# Patient Record
Sex: FEMALE | Race: BLACK OR AFRICAN AMERICAN | Employment: FULL TIME | URBAN - METROPOLITAN AREA
[De-identification: names, ages, dates, MRNs, and addresses within clinical notes are randomized per-mention and may not be internally consistent; named-entity substitution may affect disease eponyms.]

---

## 2022-01-25 ENCOUNTER — OFFICE VISIT (OUTPATIENT)
Dept: ORTHOPEDIC SURGERY | Age: 62
End: 2022-01-25
Payer: COMMERCIAL

## 2022-01-25 VITALS — BODY MASS INDEX: 39.15 KG/M2 | HEIGHT: 65 IN | WEIGHT: 235 LBS

## 2022-01-25 DIAGNOSIS — M17.12 OSTEOARTHRITIS OF LEFT KNEE, UNSPECIFIED OSTEOARTHRITIS TYPE: ICD-10-CM

## 2022-01-25 DIAGNOSIS — M25.561 RIGHT KNEE PAIN, UNSPECIFIED CHRONICITY: ICD-10-CM

## 2022-01-25 DIAGNOSIS — M17.11 OSTEOARTHRITIS OF RIGHT KNEE, UNSPECIFIED OSTEOARTHRITIS TYPE: ICD-10-CM

## 2022-01-25 DIAGNOSIS — M25.561 RIGHT KNEE PAIN, UNSPECIFIED CHRONICITY: Primary | ICD-10-CM

## 2022-01-25 PROCEDURE — 99203 OFFICE O/P NEW LOW 30 MIN: CPT | Performed by: ORTHOPAEDIC SURGERY

## 2022-01-25 RX ORDER — CYCLOBENZAPRINE HCL 10 MG
TABLET ORAL
COMMUNITY
Start: 2022-01-17

## 2022-01-25 RX ORDER — IBUPROFEN 200 MG
TABLET ORAL
COMMUNITY
Start: 2021-08-03 | End: 2022-07-26

## 2022-01-25 RX ORDER — METHYLPREDNISOLONE 4 MG/1
TABLET ORAL
COMMUNITY
Start: 2021-12-22 | End: 2022-07-26

## 2022-01-25 RX ORDER — TRAMADOL HYDROCHLORIDE 50 MG/1
TABLET ORAL
COMMUNITY
Start: 2021-12-22 | End: 2022-07-26

## 2022-01-25 RX ORDER — CARVEDILOL 12.5 MG/1
12.5 TABLET ORAL 2 TIMES DAILY
COMMUNITY
Start: 2022-01-17 | End: 2022-07-26

## 2022-01-25 RX ORDER — MELOXICAM 7.5 MG/1
TABLET ORAL
COMMUNITY
Start: 2021-12-06 | End: 2022-07-26

## 2022-01-25 NOTE — PATIENT INSTRUCTIONS
Knee Arthritis: Care Instructions  Your Care Instructions     Knee arthritis is a breakdown of the cartilage that cushions your knee joint. When the cartilage wears down, your bones rub against each other. This causes pain and stiffness. Knee arthritis tends to get worse with time. Treatment for knee arthritis involves reducing pain, making the leg muscles stronger, and staying at a healthy body weight. The treatment usually does not improve the health of the cartilage, but it can reduce pain and improve how well your knee works. You can take simple measures to protect your knee joints, ease your pain, and help you stay active. Follow-up care is a key part of your treatment and safety. Be sure to make and go to all appointments, and call your doctor if you are having problems. It's also a good idea to know your test results and keep a list of the medicines you take. How can you care for yourself at home? · Know that knee arthritis will cause more pain on some days than on others. · Stay at a healthy weight. Lose weight if you are overweight. When you stand up, the pressure on your knees from every pound of body weight is multiplied four times. So if you lose 10 pounds, you will reduce the pressure on your knees by 40 pounds. · Talk to your doctor or physical therapist about exercises that will help ease joint pain. ? Stretch to help prevent stiffness and to prevent injury before you exercise. You may enjoy gentle forms of yoga to help keep your knee joints and muscles flexible. ? Walk instead of jog.  ? Ride a bike. This makes your thigh muscles stronger and takes pressure off your knee. ? Wear well-fitting and comfortable shoes. ? Exercise in chest-deep water. This can help you exercise longer with less pain. ? Avoid exercises that include squatting or kneeling. They can put a lot of strain on your knees.   ? Talk to your doctor to make sure that the exercise you do is not making the arthritis worse.  · Do not sit for long periods of time. Try to walk once in a while to keep your knee from getting stiff. · Ask your doctor or physical therapist whether shoe inserts may reduce your arthritis pain. · If you can afford it, get new athletic shoes at least every year. This can help reduce the strain on your knees. · Use a device to help you do everyday activities. ? A cane or walking stick can help you keep your balance when you walk. Hold the cane or walking stick in the hand opposite the painful knee. ? If you feel like you may fall when you walk, try using crutches or a front-wheeled walker. These can prevent falls that could cause more damage to your knee. ? A knee brace may help keep your knee stable and prevent pain. ? You also can use other things to make life easier, such as a higher toilet seat and handrails in the bathtub or shower. · Take pain medicines exactly as directed. ? Do not wait until you are in severe pain. You will get better results if you take it sooner. ? If you are not taking a prescription pain medicine, take an over-the-counter medicine such as acetaminophen (Tylenol), ibuprofen (Advil, Motrin), or naproxen (Aleve). Read and follow all instructions on the label. ? Do not take two or more pain medicines at the same time unless the doctor told you to. Many pain medicines have acetaminophen, which is Tylenol. Too much acetaminophen (Tylenol) can be harmful. ? Tell your doctor if you take a blood thinner, have diabetes, or have allergies to shellfish. · Ask your doctor if you might benefit from a shot of steroid medicine into your knee. This may provide pain relief for several months. · Many people take the supplements glucosamine and chondroitin for osteoarthritis. Some people feel they help, but the medical research does not show that they work. Talk to your doctor before you take these supplements. When should you call for help?    Call your doctor now or seek immediate medical care if:    · You have sudden swelling, warmth, or pain in your knee.     · You have knee pain and a fever or rash.     · You have such bad pain that you cannot use your knee. Watch closely for changes in your health, and be sure to contact your doctor if you have any problems. Where can you learn more? Go to http://www.gray.com/  Enter W187 in the search box to learn more about \"Knee Arthritis: Care Instructions. \"  Current as of: April 30, 2021               Content Version: 13.0  © 6159-1754 [employer]. Care instructions adapted under license by [employer] (which disclaims liability or warranty for this information). If you have questions about a medical condition or this instruction, always ask your healthcare professional. [address] any warranty or liability for your use of this information.

## 2022-01-25 NOTE — Clinical Note
1/27/2022    Patient: Ml Brandt   YOB: 1960   Date of Visit: 1/25/2022     Naeem Garnica MD  Via     Dear Naeem Garnica MD,      Thank you for referring Ms. Marina Al to [address] [place] [address] [facility] for evaluation. My notes for this consultation are attached. If you have questions, please do not hesitate to call me. I look forward to following your patient along with you.       Sincerely,    Kade Chaudhry MD

## 2022-01-25 NOTE — PROGRESS NOTES
Name: Claudell Sida    : 1960     Service Dept: [address] [place] [address] Sports Medicine    Chief Complaint   Patient presents with    Knee Pain        Visit Vitals  Ht 5' 5\" (1.651 m)   Wt 235 lb (106.6 kg)   BMI 39.11 kg/m²        Allergies   Allergen Reactions    Lyrica [Pregabalin] Anaphylaxis    Darvocet A500 [Propoxyphene N-Acetaminophen] Nausea and Vomiting    Reglan [Metoclopramide] Other (comments)     Facial Spasms        Current Outpatient Medications   Medication Sig Dispense Refill    ibuprofen (MOTRIN) 200 mg tablet 0 Refills      carvediloL (COREG) 12.5 mg tablet Take 12.5 mg by mouth two (2) times a day.  meloxicam (MOBIC) 7.5 mg tablet       cyclobenzaprine (FLEXERIL) 10 mg tablet       traMADoL (ULTRAM) 50 mg tablet TAKE 1 TABLET BY MOUTH EVERY 6 HOURS FOR UP TO 5 DAYS AS NEEDED FOR MODERATE PAIN OR SEVERE PAIN      methylPREDNISolone (MEDROL DOSEPACK) 4 mg tablet FOLLOW PACKAGE DIRECTIONS      amLODIPine (NORVASC) 10 mg tablet Take  by mouth daily.  metFORMIN (GLUCOPHAGE) 500 mg tablet Take  by mouth two (2) times daily (with meals).  ferrous sulfate (IRON) 325 mg (65 mg iron) EC tablet Take 325 mg by mouth three (3) times daily (with meals).  methimazole (TAPAZOLE) 5 mg tablet Take 5 mg by mouth daily.  gabapentin (NEURONTIN) 300 mg capsule Take 300 mg by mouth nightly.  acetaminophen (TYLENOL) 650 mg CR tablet Take 650 mg by mouth every six (6) hours as needed for Pain.  naproxen sodium 220 mg cap Take  by mouth.  albuterol (PROVENTIL HFA, VENTOLIN HFA, PROAIR HFA) 90 mcg/actuation inhaler Take  by inhalation.  zolpidem (AMBIEN) 10 mg tablet Take  by mouth nightly as needed for Sleep.  valsartan (DIOVAN) 320 mg tablet Take 1 Tab by mouth daily. STOP DIOVAN HCTZ 30 Tab 6    carvedilol (COREG CR) 20 mg CR capsule Take 1 Cap by mouth daily.  STOP PLAIN COREG 30 Cap 6      There is no problem list on file for this patient. Family History   Problem Relation Age of Onset    Diabetes Father     Hypertension Father     Stroke Father     Hypertension Mother       Social History     Socioeconomic History    Marital status:    Tobacco Use    Smoking status: Never Smoker    Smokeless tobacco: Never Used   Vaping Use    Vaping Use: Never used   Substance and Sexual Activity    Alcohol use: Not Currently     Alcohol/week: 0.0 standard drinks    Drug use: Never    Sexual activity: Not Currently      Past Surgical History:   Procedure Laterality Date    HX CHOLECYSTECTOMY  1984    HX HYSTERECTOMY  2001      Past Medical History:   Diagnosis Date    Anemia     Arthritis     HTN (hypertension)     PCOS (polycystic ovarian syndrome)     Sarcoidosis 1981    Thyroid condition         I have reviewed and agree with [address] [place] [address] Nw and ROS and intake form in chart and the record furthermore I have reviewed prior medical record(s) regarding this patients care during this appointment. Review of Systems:   Patient is a pleasant appearing individual, appropriately dressed, well hydrated, well nourished, who is alert, appropriately oriented for age, and in no acute distress with a cane based gait and normal affect who does not appear to be in any significant pain. Physical Exam:  Left Knee -Decrease range of motion with flexion, Knee arc of greater than 50 degrees, Some crepitation, Grossly neurovascularly intact, Good cap refill, No skin lesion, Moderate swelling, No gross instability, Some quadriceps weakness Kellgren and Denzel at least grade 3    Right Knee -Decrease range of motion with flexion, Some crepitation, Grossly neurovascularly intact, Good cap refill, No skin lesion, Moderate swelling, No gross instability, Some quadriceps weaknessKellgren and Denzel at least grade 3   Encounter Diagnoses     ICD-10-CM ICD-9-CM   1. Right knee pain, unspecified chronicity  M25.561 719.46   2.  Osteoarthritis of right knee, unspecified osteoarthritis type  M17.11 715.96   3. Osteoarthritis of left knee, unspecified osteoarthritis type  M17.12 715.96       HPI:  The patient is here with a chief complaint of bilateral knee pain, throbbing, burning pain. It is a lot better. Pain is 2/10. X-rays are positive for severe OA of bilateral knees, especially on the right. Assessment/Plan:  Plan will be for right total knee replacement, general medical clearance, outpatient surgery, and we will go from there. If the patient gets worse, she is to give me a call. No restrictions in the meantime. Outpatient surgery on 5/23/2022. As part of continued conservative pain management options the patient was advised to utilize Tylenol or OTC NSAIDS as long as it is not medically contraindicated. Return to Office: Follow-up and Dispositions    · Return for schedule for surgery. Scribed by Hortensia Maradiaga LPN as dictated by [facility] [place] SARAH BETH Rojas MD.  Documentation True and Accepted Michael Rojas MD

## 2022-06-20 ENCOUNTER — HOSPITAL ENCOUNTER (OUTPATIENT)
Dept: LAB | Age: 62
Discharge: HOME | End: 2022-06-20
Payer: COMMERCIAL

## 2022-06-20 ENCOUNTER — HOSPITAL ENCOUNTER (OUTPATIENT)
Dept: GENERAL RADIOLOGY | Age: 62
Discharge: HOME | End: 2022-06-20
Payer: COMMERCIAL

## 2022-06-20 ENCOUNTER — HOSPITAL ENCOUNTER (OUTPATIENT)
Dept: NON INVASIVE DIAGNOSTICS | Age: 62
Discharge: HOME | End: 2022-06-20
Payer: COMMERCIAL

## 2022-06-20 DIAGNOSIS — M25.561 RIGHT KNEE PAIN, UNSPECIFIED CHRONICITY: ICD-10-CM

## 2022-06-20 DIAGNOSIS — M17.11 OSTEOARTHRITIS OF RIGHT KNEE, UNSPECIFIED OSTEOARTHRITIS TYPE: ICD-10-CM

## 2022-06-20 LAB
ATRIAL RATE: 70 BPM
CALCULATED P AXIS, ECG09: 56 DEGREES
CALCULATED R AXIS, ECG10: -5 DEGREES
CALCULATED T AXIS, ECG11: -135 DEGREES
DIAGNOSIS, 93000: NORMAL
P-R INTERVAL, ECG05: 180 MS
Q-T INTERVAL, ECG07: 408 MS
QRS DURATION, ECG06: 84 MS
QTC CALCULATION (BEZET), ECG08: 440 MS
VENTRICULAR RATE, ECG03: 70 BPM

## 2022-06-20 PROCEDURE — 93005 ELECTROCARDIOGRAM TRACING: CPT

## 2022-06-20 PROCEDURE — 93010 ELECTROCARDIOGRAM REPORT: CPT | Performed by: ORTHOPAEDIC SURGERY

## 2022-06-20 PROCEDURE — 71046 X-RAY EXAM CHEST 2 VIEWS: CPT

## 2022-07-01 LAB
BASOPHILS # BLD AUTO: 0.1 X10E3/UL (ref 0–0.2)
BASOPHILS NFR BLD AUTO: 1 %
BUN SERPL-MCNC: 19 MG/DL (ref 8–27)
BUN/CREAT SERPL: 12 (ref 12–28)
CALCIUM SERPL-MCNC: 9.9 MG/DL (ref 8.7–10.3)
CHLORIDE SERPL-SCNC: 99 MMOL/L (ref 96–106)
CO2 SERPL-SCNC: 16 MMOL/L (ref 20–29)
CREAT SERPL-MCNC: 1.55 MG/DL (ref 0.57–1)
EGFR: 38 ML/MIN/1.73
EOSINOPHIL # BLD AUTO: 0.4 X10E3/UL (ref 0–0.4)
EOSINOPHIL NFR BLD AUTO: 3 %
ERYTHROCYTE [DISTWIDTH] IN BLOOD BY AUTOMATED COUNT: 19.9 % (ref 11.7–15.4)
GLUCOSE SERPL-MCNC: 88 MG/DL (ref 65–99)
HCT VFR BLD AUTO: 32.9 % (ref 34–46.6)
HGB BLD-MCNC: 10.6 G/DL (ref 11.1–15.9)
IMM GRANULOCYTES # BLD AUTO: 0 X10E3/UL (ref 0–0.1)
IMM GRANULOCYTES NFR BLD AUTO: 0 %
LYMPHOCYTES # BLD AUTO: 2.5 X10E3/UL (ref 0.7–3.1)
LYMPHOCYTES NFR BLD AUTO: 21 %
MCH RBC QN AUTO: 21.8 PG (ref 26.6–33)
MCHC RBC AUTO-ENTMCNC: 32.2 G/DL (ref 31.5–35.7)
MCV RBC AUTO: 68 FL (ref 79–97)
MONOCYTES # BLD AUTO: 0.7 X10E3/UL (ref 0.1–0.9)
MONOCYTES NFR BLD AUTO: 6 %
MRSA DNA SPEC QL NAA+PROBE: NEGATIVE
NEUTROPHILS # BLD AUTO: 8 X10E3/UL (ref 1.4–7)
NEUTROPHILS NFR BLD AUTO: 69 %
PLATELET # BLD AUTO: 345 X10E3/UL (ref 150–450)
POTASSIUM SERPL-SCNC: 5.5 MMOL/L (ref 3.5–5.2)
RBC # BLD AUTO: 4.86 X10E6/UL (ref 3.77–5.28)
SODIUM SERPL-SCNC: 136 MMOL/L (ref 134–144)
WBC # BLD AUTO: 11.7 X10E3/UL (ref 3.4–10.8)

## 2022-07-07 DIAGNOSIS — M25.561 RIGHT KNEE PAIN, UNSPECIFIED CHRONICITY: Primary | ICD-10-CM

## 2022-07-07 DIAGNOSIS — M17.11 OSTEOARTHRITIS OF RIGHT KNEE, UNSPECIFIED OSTEOARTHRITIS TYPE: ICD-10-CM

## 2022-07-07 DIAGNOSIS — Z96.651 STATUS POST TOTAL RIGHT KNEE REPLACEMENT: ICD-10-CM

## 2022-07-14 ENCOUNTER — TRANSCRIBE ORDER (OUTPATIENT)
Dept: SCHEDULING | Age: 62
End: 2022-07-14

## 2022-07-14 DIAGNOSIS — R80.9 PROTEINURIA: ICD-10-CM

## 2022-07-14 DIAGNOSIS — N18.9 CHRONIC KIDNEY DISEASE, UNSPECIFIED: Primary | ICD-10-CM

## 2022-07-18 ENCOUNTER — HOSPITAL ENCOUNTER (OUTPATIENT)
Dept: ULTRASOUND IMAGING | Age: 62
Discharge: HOME | End: 2022-07-18
Attending: INTERNAL MEDICINE
Payer: COMMERCIAL

## 2022-07-18 ENCOUNTER — HOSPITAL ENCOUNTER (OUTPATIENT)
Dept: LAB | Age: 62
Discharge: HOME | End: 2022-07-18
Attending: INTERNAL MEDICINE
Payer: COMMERCIAL

## 2022-07-18 ENCOUNTER — TRANSCRIBE ORDER (OUTPATIENT)
Dept: REGISTRATION | Age: 62
End: 2022-07-18

## 2022-07-18 DIAGNOSIS — N18.9 ANEMIA OF CHRONIC RENAL FAILURE: ICD-10-CM

## 2022-07-18 DIAGNOSIS — N18.9 ANEMIA OF CHRONIC RENAL FAILURE: Primary | ICD-10-CM

## 2022-07-18 DIAGNOSIS — N18.9 CHRONIC KIDNEY DISEASE, UNSPECIFIED: ICD-10-CM

## 2022-07-18 DIAGNOSIS — D63.1 ANEMIA OF CHRONIC RENAL FAILURE: ICD-10-CM

## 2022-07-18 DIAGNOSIS — R80.9 PROTEINURIA: ICD-10-CM

## 2022-07-18 DIAGNOSIS — D63.1 ANEMIA OF CHRONIC RENAL FAILURE: Primary | ICD-10-CM

## 2022-07-18 LAB
ALBUMIN SERPL-MCNC: 3.5 G/DL (ref 3.5–5)
ANION GAP SERPL CALC-SCNC: 7 MMOL/L (ref 5–15)
BASOPHILS # BLD: 0.1 K/UL (ref 0–0.1)
BASOPHILS NFR BLD: 1 % (ref 0–1)
BUN SERPL-MCNC: 24 MG/DL (ref 6–20)
BUN/CREAT SERPL: 17 (ref 12–20)
CA-I BLD-MCNC: 9.6 MG/DL (ref 8.5–10.1)
CHLORIDE SERPL-SCNC: 104 MMOL/L (ref 97–108)
CO2 SERPL-SCNC: 25 MMOL/L (ref 21–32)
CREAT SERPL-MCNC: 1.43 MG/DL (ref 0.55–1.02)
CREAT SERPL-MCNC: 1.44 MG/DL (ref 0.55–1.02)
DIFFERENTIAL METHOD BLD: ABNORMAL
EOSINOPHIL # BLD: 0.4 K/UL (ref 0–0.4)
EOSINOPHIL NFR BLD: 4 % (ref 0–7)
ERYTHROCYTE [DISTWIDTH] IN BLOOD BY AUTOMATED COUNT: 20 % (ref 11.5–14.5)
GLUCOSE SERPL-MCNC: 85 MG/DL (ref 65–100)
HCT VFR BLD AUTO: 34.1 % (ref 35–47)
HGB BLD-MCNC: 10.2 G/DL (ref 11.5–16)
IMM GRANULOCYTES # BLD AUTO: 0.1 K/UL (ref 0–0.04)
IMM GRANULOCYTES NFR BLD AUTO: 1 % (ref 0–0.5)
LYMPHOCYTES # BLD: 2.5 K/UL (ref 0.8–3.5)
LYMPHOCYTES NFR BLD: 23 % (ref 12–49)
MCH RBC QN AUTO: 21.2 PG (ref 26–34)
MCHC RBC AUTO-ENTMCNC: 29.9 G/DL (ref 30–36.5)
MCV RBC AUTO: 70.7 FL (ref 80–99)
MONOCYTES # BLD: 0.7 K/UL (ref 0–1)
MONOCYTES NFR BLD: 7 % (ref 5–13)
NEUTS SEG # BLD: 7 K/UL (ref 1.8–8)
NEUTS SEG NFR BLD: 64 % (ref 32–75)
NRBC # BLD: 0 K/UL (ref 0–0.01)
NRBC BLD-RTO: 0 PER 100 WBC
PHOSPHATE SERPL-MCNC: 3.4 MG/DL (ref 2.6–4.7)
PLATELET # BLD AUTO: 316 K/UL (ref 150–400)
POTASSIUM SERPL-SCNC: 4.6 MMOL/L (ref 3.5–5.1)
RBC # BLD AUTO: 4.82 M/UL (ref 3.8–5.2)
SODIUM SERPL-SCNC: 136 MMOL/L (ref 136–145)
WBC # BLD AUTO: 10.8 K/UL (ref 3.6–11)

## 2022-07-18 PROCEDURE — 83550 IRON BINDING TEST: CPT

## 2022-07-18 PROCEDURE — 36415 COLL VENOUS BLD VENIPUNCTURE: CPT

## 2022-07-18 PROCEDURE — 76770 US EXAM ABDO BACK WALL COMP: CPT

## 2022-07-18 PROCEDURE — 83970 ASSAY OF PARATHORMONE: CPT

## 2022-07-18 PROCEDURE — 80069 RENAL FUNCTION PANEL: CPT

## 2022-07-18 PROCEDURE — 82306 VITAMIN D 25 HYDROXY: CPT

## 2022-07-18 PROCEDURE — 85025 COMPLETE CBC W/AUTO DIFF WBC: CPT

## 2022-07-18 PROCEDURE — 83540 ASSAY OF IRON: CPT

## 2022-07-19 LAB
25(OH)D3 SERPL-MCNC: 38.8 NG/ML (ref 30–100)
CA-I BLD-MCNC: 9.9 MG/DL (ref 8.5–10.1)
PTH-INTACT SERPL-MCNC: 56 PG/ML (ref 18.4–88)

## 2022-07-20 LAB
IRON SATN MFR SERPL: 13 % (ref 15–55)
IRON,IRN: 43 UG/DL (ref 27–139)
TIBC SERPL-MCNC: 328 UG/DL (ref 250–450)
UIBC SERPL-MCNC: 285 UG/DL (ref 118–369)

## 2022-07-20 RX ORDER — METOPROLOL SUCCINATE 50 MG/1
50 TABLET, EXTENDED RELEASE ORAL DAILY
COMMUNITY

## 2022-07-25 ENCOUNTER — ANESTHESIA EVENT (OUTPATIENT)
Dept: SURGERY | Age: 62
End: 2022-07-25
Payer: COMMERCIAL

## 2022-07-25 RX ORDER — SODIUM CHLORIDE 0.9 % (FLUSH) 0.9 %
5-40 SYRINGE (ML) INJECTION EVERY 8 HOURS
Status: CANCELLED | OUTPATIENT
Start: 2022-07-25

## 2022-07-25 RX ORDER — INSULIN LISPRO 100 [IU]/ML
INJECTION, SOLUTION INTRAVENOUS; SUBCUTANEOUS ONCE
Status: CANCELLED | OUTPATIENT
Start: 2022-07-25 | End: 2022-07-25

## 2022-07-26 ENCOUNTER — OFFICE VISIT (OUTPATIENT)
Dept: ORTHOPEDIC SURGERY | Age: 62
End: 2022-07-26
Payer: COMMERCIAL

## 2022-07-26 ENCOUNTER — HOSPITAL ENCOUNTER (OUTPATIENT)
Dept: PREADMISSION TESTING | Age: 62
Discharge: HOME | End: 2022-07-26
Payer: COMMERCIAL

## 2022-07-26 DIAGNOSIS — M17.11 OSTEOARTHRITIS OF RIGHT KNEE, UNSPECIFIED OSTEOARTHRITIS TYPE: ICD-10-CM

## 2022-07-26 DIAGNOSIS — M25.561 RIGHT KNEE PAIN, UNSPECIFIED CHRONICITY: Primary | ICD-10-CM

## 2022-07-26 PROBLEM — N95.1 HOT FLASHES DUE TO MENOPAUSE: Status: ACTIVE | Noted: 2021-12-23

## 2022-07-26 PROBLEM — M17.0 ARTHRITIS OF BOTH KNEES: Status: ACTIVE | Noted: 2022-07-26

## 2022-07-26 PROBLEM — F43.22 ADJUSTMENT DISORDER WITH ANXIOUS MOOD: Status: ACTIVE | Noted: 2021-12-23

## 2022-07-26 PROBLEM — M11.20 CHONDROCALCINOSIS DUE TO PYROPHOSPHATE CRYSTALS: Status: ACTIVE | Noted: 2021-09-18

## 2022-07-26 PROBLEM — E05.90 HYPERTHYROIDISM: Status: ACTIVE | Noted: 2021-12-23

## 2022-07-26 PROBLEM — E28.2 POLYCYSTIC OVARIES: Status: ACTIVE | Noted: 2021-12-23

## 2022-07-26 PROBLEM — F51.01 PRIMARY INSOMNIA: Status: ACTIVE | Noted: 2021-12-23

## 2022-07-26 PROBLEM — E66.01 MORBID OBESITY (HCC): Status: ACTIVE | Noted: 2021-12-23

## 2022-07-26 PROBLEM — E05.00 GRAVES DISEASE: Status: ACTIVE | Noted: 2021-12-23

## 2022-07-26 PROBLEM — R73.01 IMPAIRED FASTING GLUCOSE: Status: ACTIVE | Noted: 2021-12-23

## 2022-07-26 PROBLEM — I51.89 DIASTOLIC DYSFUNCTION: Status: ACTIVE | Noted: 2021-12-23

## 2022-07-26 PROBLEM — M06.9 RHEUMATOID ARTHRITIS (HCC): Status: ACTIVE | Noted: 2021-12-23

## 2022-07-26 PROBLEM — I10 ESSENTIAL HYPERTENSION: Status: ACTIVE | Noted: 2021-12-23

## 2022-07-26 PROBLEM — D86.9 SARCOIDOSIS: Status: ACTIVE | Noted: 2021-12-23

## 2022-07-26 PROBLEM — D50.0 IRON DEFICIENCY ANEMIA DUE TO CHRONIC BLOOD LOSS: Status: ACTIVE | Noted: 2021-12-23

## 2022-07-26 LAB — SARS-COV-2, COV2: NORMAL

## 2022-07-26 PROCEDURE — U0003 INFECTIOUS AGENT DETECTION BY NUCLEIC ACID (DNA OR RNA); SEVERE ACUTE RESPIRATORY SYNDROME CORONAVIRUS 2 (SARS-COV-2) (CORONAVIRUS DISEASE [COVID-19]), AMPLIFIED PROBE TECHNIQUE, MAKING USE OF HIGH THROUGHPUT TECHNOLOGIES AS DESCRIBED BY CMS-2020-01-R: HCPCS

## 2022-07-26 PROCEDURE — 99214 OFFICE O/P EST MOD 30 MIN: CPT | Performed by: ORTHOPAEDIC SURGERY

## 2022-07-26 RX ORDER — ONDANSETRON 4 MG/1
4 TABLET, ORALLY DISINTEGRATING ORAL
Qty: 20 TABLET | Refills: 0 | Status: SHIPPED | OUTPATIENT
Start: 2022-07-26

## 2022-07-26 RX ORDER — OXYCODONE AND ACETAMINOPHEN 5; 325 MG/1; MG/1
1 TABLET ORAL
Qty: 30 TABLET | Refills: 0 | Status: SHIPPED | OUTPATIENT
Start: 2022-07-26 | End: 2022-08-08 | Stop reason: ALTCHOICE

## 2022-07-26 RX ORDER — CEPHALEXIN 500 MG/1
500 CAPSULE ORAL EVERY 8 HOURS
Qty: 9 CAPSULE | Refills: 0 | Status: SHIPPED | OUTPATIENT
Start: 2022-07-26 | End: 2022-07-29

## 2022-07-26 RX ORDER — HYDROCODONE BITARTRATE AND ACETAMINOPHEN 5; 325 MG/1; MG/1
TABLET ORAL
COMMUNITY
Start: 2022-07-01

## 2022-07-26 NOTE — H&P (VIEW-ONLY)
Name: Raúl Joyce    : 1960     Service Dept: [address] [place] [address] Sports Medicine    Chief Complaint   Patient presents with    Pre-op Exam    Knee Pain        There were no vitals taken for this visit. Allergies   Allergen Reactions    Lyrica [Pregabalin] Anaphylaxis    Codeine Other (comments)    Metoclopramide Other (comments)     Facial Spasms  Other reaction(s): could not open eyes    Oxycodone Hcl Other (comments)    Propoxyphene N-Acetaminophen Nausea and Vomiting     Other reaction(s): N & V        Current Outpatient Medications   Medication Sig Dispense Refill    HYDROcodone-acetaminophen (NORCO) 5-325 mg per tablet TAKE 1 TABLET BY MOUTH EVERY 12 HOURS AS NEEDED FOR 14 DAYS      metoprolol succinate (TOPROL-XL) 50 mg XL tablet Take 50 mg by mouth in the morning. cyclobenzaprine (FLEXERIL) 10 mg tablet       amLODIPine (NORVASC) 10 mg tablet Take  by mouth daily. ferrous sulfate (IRON) 325 mg (65 mg iron) EC tablet Take 325 mg by mouth three (3) times daily (with meals). acetaminophen (TYLENOL) 650 mg CR tablet Take 650 mg by mouth every six (6) hours as needed for Pain. albuterol (PROVENTIL HFA, VENTOLIN HFA, PROAIR HFA) 90 mcg/actuation inhaler Take  by inhalation.         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    Arthritis of both knees M17.0    Adjustment disorder with anxious mood F43.22    Impaired fasting glucose R73.01    Hot flashes due to menopause N95.1    Graves disease E05.00    Primary insomnia F51.01    Polycystic ovaries E28.2    Morbid obesity (HCC) E66.01    Iron deficiency anemia due to chronic blood loss D50.0    Hyperthyroidism E05.90    Sarcoidosis D86.9    Rheumatoid arthritis (HCC) M06.9      Family History   Problem Relation Age of Onset    Diabetes Father     Hypertension Father     Stroke Father Hypertension Mother       Social History     Socioeconomic History    Marital status:    Tobacco Use    Smoking status: Never    Smokeless tobacco: Never   Vaping Use    Vaping Use: Never used   Substance and Sexual Activity    Alcohol use: Not Currently     Alcohol/week: 0.0 standard drinks    Drug use: Never    Sexual activity: Not Currently      Past Surgical History:   Procedure Laterality Date    HX CHOLECYSTECTOMY  1984    HX HYSTERECTOMY  2001      Past Medical History:   Diagnosis Date    Anemia     Arthritis     HTN (hypertension)     PCOS (polycystic ovarian syndrome)     Sarcoidosis 1981    Thyroid condition         I have reviewed and agree with [facility] Nw and ROS and intake form in chart and the record furthermore I have reviewed prior medical record(s) regarding this patients care during this appointment. Review of Systems:   Patient is a pleasant appearing individual, appropriately dressed, well hydrated, well nourished, who is alert, appropriately oriented for age, and in no acute distress with a normal gait and normal affect who does not appear to be in any significant pain. Physical Exam:  Left Knee -Decrease range of motion with flexion, Knee arc of greater than 50 degrees, Some crepitation, Grossly neurovascularly intact, Good cap refill, No skin lesion, Moderate swelling, some gross instability, Some quadriceps weakness Kellgren and Denzel at least grade 3    Right Knee -Decrease range of motion with flexion, Some crepitation, Grossly neurovascularly intact, Good cap refill, No skin lesion, Moderate swelling, some gross instability, Some quadriceps weaknessKellgren and Denzel at least grade 3     Inpatient status: The patient has admitted to severe pain in the affected knee and due to such pain they are unable to complete activities of daily living at home and/or work on a regular basis where conservative treatments have failed.  After extensive discussion with the patient, they have chosen to receive a total knee replacement with the expectation of inpatient procedure. Their dependent functional status (i.e. lack of capable support and safety at home, pain management, comorbities, or difficulty ambulating with assistive walking devices) would deem them a candidate for an inpatient stay. The patient acknowledges and understand the plan. The risks of surgery were explained to the patient which include but not limited to infection, nerve injury, artery injury, tendon injury, poor result, poor wound healing, unforeseen incidence, bleeding, infection, nerve damage, failure to improve, worsening of symptoms, morbidity, and mortality risks were explained. All questions were answered. Patient was told of no guarantees. Patient accepts all risks and benefits. A consent for surgery will be documented and signed by the patient or a legal guardian. All questions were answered. The procedure was explained in detail. The patient was counseled about the risks of adriane Covid-19 during their perioperative period and any recovery window from their procedure. The patient was made aware that adriane Covid-19 may worsen their prognosis for recovering from their procedure and lend to a higher morbidity and/or mortality risk. All material risks, benefits, and reasonable alternatives including postponing the procedure were discussed. The patient DOES wish to proceed with their procedure at this time. Encounter Diagnoses     ICD-10-CM ICD-9-CM   1. Right knee pain, unspecified chronicity  M25.561 719.46   2. Osteoarthritis of right knee, unspecified osteoarthritis type  M17.11 715.96       HPI:  The patient is here with a chief complaint of right knee pain, throbbing, burning pain. It has been the same. Pain is 5/10. X-rays are positive for severe OA. Assessment/Plan:  Plan will be for right total knee replacement. General medical clearance has been done. No history of blood clots.   Does not take any blood thinners. No surgical complication history. Of note, she wants to get the left one done in about 6-8 weeks later. We will use old clearance and go from there. She does not have any hardware in her left knee. As part of continued conservative pain management options the patient was advised to utilize Tylenol or OTC NSAIDS as long as it is not medically contraindicated. Return to Office: Follow-up and Dispositions    Return for already scheduled for surgery. Scribed by Daivd Merlin, LPN as dictated by [facility] [place] SARAH BETH Ferraro MD.  Documentation True and Accepted Michael Ferraro MD

## 2022-07-26 NOTE — LETTER
7/28/2022    Patient: Karlos Loo   YOB: 1960   Date of Visit: 7/26/2022     Linda Tan MD  [facility] [address] [address]  Via Fax: [phone]    Dear Linda Tan MD,      Thank you for referring Ms. Marina Al to [address] [place] [address] AND [facility]ORTS MEDICINE for evaluation. My notes for this consultation are attached. If you have questions, please do not hesitate to call me. I look forward to following your patient along with you.       Sincerely,    Nasrin Chase MD

## 2022-07-26 NOTE — PROGRESS NOTES
Name: Garland Garcia    : 1960     Service Dept: [address] [place] [address] Sports Medicine    Chief Complaint   Patient presents with    Pre-op Exam    Knee Pain        There were no vitals taken for this visit. Allergies   Allergen Reactions    Lyrica [Pregabalin] Anaphylaxis    Codeine Other (comments)    Metoclopramide Other (comments)     Facial Spasms  Other reaction(s): could not open eyes    Oxycodone Hcl Other (comments)    Propoxyphene N-Acetaminophen Nausea and Vomiting     Other reaction(s): N & V        Current Outpatient Medications   Medication Sig Dispense Refill    HYDROcodone-acetaminophen (NORCO) 5-325 mg per tablet TAKE 1 TABLET BY MOUTH EVERY 12 HOURS AS NEEDED FOR 14 DAYS      metoprolol succinate (TOPROL-XL) 50 mg XL tablet Take 50 mg by mouth in the morning. cyclobenzaprine (FLEXERIL) 10 mg tablet       amLODIPine (NORVASC) 10 mg tablet Take  by mouth daily. ferrous sulfate (IRON) 325 mg (65 mg iron) EC tablet Take 325 mg by mouth three (3) times daily (with meals). acetaminophen (TYLENOL) 650 mg CR tablet Take 650 mg by mouth every six (6) hours as needed for Pain. albuterol (PROVENTIL HFA, VENTOLIN HFA, PROAIR HFA) 90 mcg/actuation inhaler Take  by inhalation.         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    Arthritis of both knees M17.0    Adjustment disorder with anxious mood F43.22    Impaired fasting glucose R73.01    Hot flashes due to menopause N95.1    Graves disease E05.00    Primary insomnia F51.01    Polycystic ovaries E28.2    Morbid obesity (HCC) E66.01    Iron deficiency anemia due to chronic blood loss D50.0    Hyperthyroidism E05.90    Sarcoidosis D86.9    Rheumatoid arthritis (HCC) M06.9      Family History   Problem Relation Age of Onset    Diabetes Father     Hypertension Father     Stroke Father Hypertension Mother       Social History     Socioeconomic History    Marital status:    Tobacco Use    Smoking status: Never    Smokeless tobacco: Never   Vaping Use    Vaping Use: Never used   Substance and Sexual Activity    Alcohol use: Not Currently     Alcohol/week: 0.0 standard drinks    Drug use: Never    Sexual activity: Not Currently      Past Surgical History:   Procedure Laterality Date    HX CHOLECYSTECTOMY  1984    HX HYSTERECTOMY  2001      Past Medical History:   Diagnosis Date    Anemia     Arthritis     HTN (hypertension)     PCOS (polycystic ovarian syndrome)     Sarcoidosis 1981    Thyroid condition         I have reviewed and agree with [facility] Nw and ROS and intake form in chart and the record furthermore I have reviewed prior medical record(s) regarding this patients care during this appointment. Review of Systems:   Patient is a pleasant appearing individual, appropriately dressed, well hydrated, well nourished, who is alert, appropriately oriented for age, and in no acute distress with a normal gait and normal affect who does not appear to be in any significant pain. Physical Exam:  Left Knee -Decrease range of motion with flexion, Knee arc of greater than 50 degrees, Some crepitation, Grossly neurovascularly intact, Good cap refill, No skin lesion, Moderate swelling, some gross instability, Some quadriceps weakness Kellgren and Denzel at least grade 3    Right Knee -Decrease range of motion with flexion, Some crepitation, Grossly neurovascularly intact, Good cap refill, No skin lesion, Moderate swelling, some gross instability, Some quadriceps weaknessKellgren and Denzel at least grade 3     Inpatient status: The patient has admitted to severe pain in the affected knee and due to such pain they are unable to complete activities of daily living at home and/or work on a regular basis where conservative treatments have failed.  After extensive discussion with the patient, they have chosen to receive a total knee replacement with the expectation of inpatient procedure. Their dependent functional status (i.e. lack of capable support and safety at home, pain management, comorbities, or difficulty ambulating with assistive walking devices) would deem them a candidate for an inpatient stay. The patient acknowledges and understand the plan. The risks of surgery were explained to the patient which include but not limited to infection, nerve injury, artery injury, tendon injury, poor result, poor wound healing, unforeseen incidence, bleeding, infection, nerve damage, failure to improve, worsening of symptoms, morbidity, and mortality risks were explained. All questions were answered. Patient was told of no guarantees. Patient accepts all risks and benefits. A consent for surgery will be documented and signed by the patient or a legal guardian. All questions were answered. The procedure was explained in detail. The patient was counseled about the risks of adriane Covid-19 during their perioperative period and any recovery window from their procedure. The patient was made aware that adriane Covid-19 may worsen their prognosis for recovering from their procedure and lend to a higher morbidity and/or mortality risk. All material risks, benefits, and reasonable alternatives including postponing the procedure were discussed. The patient DOES wish to proceed with their procedure at this time. Encounter Diagnoses     ICD-10-CM ICD-9-CM   1. Right knee pain, unspecified chronicity  M25.561 719.46   2. Osteoarthritis of right knee, unspecified osteoarthritis type  M17.11 715.96       HPI:  The patient is here with a chief complaint of right knee pain, throbbing, burning pain. It has been the same. Pain is 5/10. X-rays are positive for severe OA. Assessment/Plan:  Plan will be for right total knee replacement. General medical clearance has been done. No history of blood clots.   Does not take any blood thinners. No surgical complication history. Of note, she wants to get the left one done in about 6-8 weeks later. We will use old clearance and go from there. She does not have any hardware in her left knee. As part of continued conservative pain management options the patient was advised to utilize Tylenol or OTC NSAIDS as long as it is not medically contraindicated. Return to Office: Follow-up and Dispositions    Return for already scheduled for surgery. Scribed by Vaishali Eric LPN as dictated by [facility] [place] SARAH BETH Vivas MD.  Documentation True and Accepted Michael Vivas MD

## 2022-07-26 NOTE — PATIENT INSTRUCTIONS
Knee Arthritis: Care Instructions  Your Care Instructions     Knee arthritis is a breakdown of the cartilage that cushions your knee joint. When the cartilage wears down, your bones rub against each other. This causes pain and stiffness. Knee arthritis tends to get worse with time. Treatment for knee arthritis involves reducing pain, making the leg muscles stronger, and staying at a healthy body weight. The treatment usually does not improve the health of the cartilage, but it can reduce pain and improve how well your knee works. You can take simple measures to protect your knee joints, ease your pain, and help you stay active. Follow-up care is a key part of your treatment and safety. Be sure to make and go to all appointments, and call your doctor if you are having problems. It's also a good idea to know your test results and keep a list of the medicines you take. How can you care for yourself at home? Know that knee arthritis will cause more pain on some days than on others. Stay at a healthy weight. Lose weight if you are overweight. When you stand up, the pressure on your knees from every pound of body weight is multiplied four times. So if you lose 10 pounds, you will reduce the pressure on your knees by 40 pounds. Talk to your doctor or physical therapist about exercises that will help ease joint pain. Stretch to help prevent stiffness and to prevent injury before you exercise. You may enjoy gentle forms of yoga to help keep your knee joints and muscles flexible. Walk instead of jog. Ride a bike. This makes your thigh muscles stronger and takes pressure off your knee. Wear well-fitting and comfortable shoes. Exercise in chest-deep water. This can help you exercise longer with less pain. Avoid exercises that include squatting or kneeling. They can put a lot of strain on your knees. Talk to your doctor to make sure that the exercise you do is not making the arthritis worse.   Do not sit for long periods of time. Try to walk once in a while to keep your knee from getting stiff. Ask your doctor or physical therapist whether shoe inserts may reduce your arthritis pain. If you can afford it, get new athletic shoes at least every year. This can help reduce the strain on your knees. Use a device to help you do everyday activities. A cane or walking stick can help you keep your balance when you walk. Hold the cane or walking stick in the hand opposite the painful knee. If you feel like you may fall when you walk, try using crutches or a front-wheeled walker. These can prevent falls that could cause more damage to your knee. A knee brace may help keep your knee stable and prevent pain. You also can use other things to make life easier, such as a higher toilet seat and handrails in the bathtub or shower. Take pain medicines exactly as directed. Do not wait until you are in severe pain. You will get better results if you take it sooner. If you are not taking a prescription pain medicine, take an over-the-counter medicine such as acetaminophen (Tylenol), ibuprofen (Advil, Motrin), or naproxen (Aleve). Read and follow all instructions on the label. Do not take two or more pain medicines at the same time unless the doctor told you to. Many pain medicines have acetaminophen, which is Tylenol. Too much acetaminophen (Tylenol) can be harmful. Tell your doctor if you take a blood thinner, have diabetes, or have allergies to shellfish. Ask your doctor if you might benefit from a shot of steroid medicine into your knee. This may provide pain relief for several months. Many people take the supplements glucosamine and chondroitin for osteoarthritis. Some people feel they help, but the medical research does not show that they work. Talk to your doctor before you take these supplements. When should you call for help?    Call your doctor now or seek immediate medical care if:    You have sudden swelling, warmth, or pain in your knee. You have knee pain and a fever or rash. You have such bad pain that you cannot use your knee. Watch closely for changes in your health, and be sure to contact your doctor if you have any problems. Where can you learn more? Go to http://www.gray.com/  Enter W187 in the search box to learn more about \"Knee Arthritis: Care Instructions. \"  Current as of: December 20, 2021               Content Version: 13.2  © 2006-2022 [employer]. Care instructions adapted under license by [employer] (which disclaims liability or warranty for this information). If you have questions about a medical condition or this instruction, always ask your healthcare professional. [address] any warranty or liability for your use of this information.

## 2022-07-27 LAB — SARS-COV-2, NAA: NOT DETECTED

## 2022-08-01 ENCOUNTER — HOSPITAL ENCOUNTER (OUTPATIENT)
Age: 62
Discharge: HOME | End: 2022-08-01
Attending: ORTHOPAEDIC SURGERY | Admitting: ORTHOPAEDIC SURGERY
Payer: COMMERCIAL

## 2022-08-01 ENCOUNTER — ANESTHESIA (OUTPATIENT)
Dept: SURGERY | Age: 62
End: 2022-08-01
Payer: COMMERCIAL

## 2022-08-01 ENCOUNTER — APPOINTMENT (OUTPATIENT)
Dept: GENERAL RADIOLOGY | Age: 62
End: 2022-08-01
Attending: NURSE PRACTITIONER
Payer: COMMERCIAL

## 2022-08-01 VITALS
RESPIRATION RATE: 16 BRPM | TEMPERATURE: 96.8 F | SYSTOLIC BLOOD PRESSURE: 140 MMHG | OXYGEN SATURATION: 98 % | DIASTOLIC BLOOD PRESSURE: 76 MMHG | BODY MASS INDEX: 37.52 KG/M2 | WEIGHT: 225.2 LBS | HEART RATE: 84 BPM | HEIGHT: 65 IN

## 2022-08-01 PROBLEM — M17.9 OA (OSTEOARTHRITIS) OF KNEE: Status: ACTIVE | Noted: 2022-08-01

## 2022-08-01 PROCEDURE — 77030038692 HC WND DEB SYS IRMX -B: Performed by: ORTHOPAEDIC SURGERY

## 2022-08-01 PROCEDURE — 74011250636 HC RX REV CODE- 250/636: Performed by: NURSE PRACTITIONER

## 2022-08-01 PROCEDURE — 74011250637 HC RX REV CODE- 250/637: Performed by: NURSE ANESTHETIST, CERTIFIED REGISTERED

## 2022-08-01 PROCEDURE — 77030039147 HC PWDR HEMSTS SURGICEL JNJ -D: Performed by: ORTHOPAEDIC SURGERY

## 2022-08-01 PROCEDURE — 74011000272 HC RX REV CODE- 272: Performed by: ORTHOPAEDIC SURGERY

## 2022-08-01 PROCEDURE — C1776 JOINT DEVICE (IMPLANTABLE): HCPCS | Performed by: ORTHOPAEDIC SURGERY

## 2022-08-01 PROCEDURE — 77030007866 HC KT SPN ANES BBMI -B: Performed by: NURSE ANESTHETIST, CERTIFIED REGISTERED

## 2022-08-01 PROCEDURE — 77030040393 HC DRSG OPTIFOAM GENT MDII -B: Performed by: ORTHOPAEDIC SURGERY

## 2022-08-01 PROCEDURE — 77030031139 HC SUT VCRL2 J&J -A: Performed by: ORTHOPAEDIC SURGERY

## 2022-08-01 PROCEDURE — 74011250637 HC RX REV CODE- 250/637: Performed by: NURSE PRACTITIONER

## 2022-08-01 PROCEDURE — 77030003601 HC NDL NRV BLK BBMI -A: Performed by: NURSE ANESTHETIST, CERTIFIED REGISTERED

## 2022-08-01 PROCEDURE — 74011250636 HC RX REV CODE- 250/636: Performed by: NURSE ANESTHETIST, CERTIFIED REGISTERED

## 2022-08-01 PROCEDURE — 77030011266 HC ELECTRD BLD INSL COVD -A: Performed by: ORTHOPAEDIC SURGERY

## 2022-08-01 PROCEDURE — C1713 ANCHOR/SCREW BN/BN,TIS/BN: HCPCS | Performed by: ORTHOPAEDIC SURGERY

## 2022-08-01 PROCEDURE — 77030006835 HC BLD SAW SAG STRY -B: Performed by: ORTHOPAEDIC SURGERY

## 2022-08-01 PROCEDURE — 64450 NJX AA&/STRD OTHER PN/BRANCH: CPT | Performed by: NURSE ANESTHETIST, CERTIFIED REGISTERED

## 2022-08-01 PROCEDURE — 77030040361 HC SLV COMPR DVT MDII -B: Performed by: ORTHOPAEDIC SURGERY

## 2022-08-01 PROCEDURE — 97161 PT EVAL LOW COMPLEX 20 MIN: CPT

## 2022-08-01 PROCEDURE — 73560 X-RAY EXAM OF KNEE 1 OR 2: CPT

## 2022-08-01 PROCEDURE — 77030018673: Performed by: ORTHOPAEDIC SURGERY

## 2022-08-01 PROCEDURE — 77030000032 HC CUF TRNQT ZIMM -B: Performed by: ORTHOPAEDIC SURGERY

## 2022-08-01 PROCEDURE — 2709999900 HC NON-CHARGEABLE SUPPLY: Performed by: ORTHOPAEDIC SURGERY

## 2022-08-01 PROCEDURE — 77030006812 HC BLD SAW RECIP STRY -B: Performed by: ORTHOPAEDIC SURGERY

## 2022-08-01 PROCEDURE — 77030013708 HC HNDPC SUC IRR PULS STRY –B: Performed by: ORTHOPAEDIC SURGERY

## 2022-08-01 PROCEDURE — 64447 NJX AA&/STRD FEMORAL NRV IMG: CPT

## 2022-08-01 PROCEDURE — 76010000153 HC OR TIME 1.5 TO 2 HR: Performed by: ORTHOPAEDIC SURGERY

## 2022-08-01 PROCEDURE — 76210000063 HC OR PH I REC FIRST 0.5 HR: Performed by: ORTHOPAEDIC SURGERY

## 2022-08-01 PROCEDURE — 77030029372 HC ADH SKN CLSR PRINEO J&J -C: Performed by: ORTHOPAEDIC SURGERY

## 2022-08-01 PROCEDURE — 76210000025 HC REC RM PH II 3 TO 3.5 HR: Performed by: ORTHOPAEDIC SURGERY

## 2022-08-01 PROCEDURE — 77030013079 HC BLNKT BAIR HGGR 3M -A: Performed by: NURSE ANESTHETIST, CERTIFIED REGISTERED

## 2022-08-01 PROCEDURE — 97116 GAIT TRAINING THERAPY: CPT

## 2022-08-01 PROCEDURE — 74011000250 HC RX REV CODE- 250: Performed by: NURSE ANESTHETIST, CERTIFIED REGISTERED

## 2022-08-01 PROCEDURE — 76060000034 HC ANESTHESIA 1.5 TO 2 HR: Performed by: ORTHOPAEDIC SURGERY

## 2022-08-01 PROCEDURE — 77030031140 HC SUT VCRL3 J&J -A: Performed by: ORTHOPAEDIC SURGERY

## 2022-08-01 PROCEDURE — 76942 ECHO GUIDE FOR BIOPSY: CPT | Performed by: NURSE ANESTHETIST, CERTIFIED REGISTERED

## 2022-08-01 PROCEDURE — 77030014007 HC SPNG HEMSTAT J&J -B: Performed by: ORTHOPAEDIC SURGERY

## 2022-08-01 PROCEDURE — 74011000250 HC RX REV CODE- 250: Performed by: ORTHOPAEDIC SURGERY

## 2022-08-01 DEVICE — CEMENT BNE GENTAMC HV R+G 40GM -- PALACOS R+G 5036964: Type: IMPLANTABLE DEVICE | Site: KNEE | Status: FUNCTIONAL

## 2022-08-01 DEVICE — ATTUNE KNEE SYSTEM TIBIAL INSERT ROTATING PLATFORM POSTERIOR STABILIZED 5 6MM AOX
Type: IMPLANTABLE DEVICE | Site: KNEE | Status: FUNCTIONAL
Brand: ATTUNE

## 2022-08-01 DEVICE — ATTUNE KNEE SYSTEM REVISION ROTATING PLATFORM TIBIAL BASE CEMENTED SIZE 5
Type: IMPLANTABLE DEVICE | Site: KNEE | Status: FUNCTIONAL
Brand: ATTUNE

## 2022-08-01 DEVICE — ATTUNE PATELLA MEDIALIZED DOME 35MM CEMENTED AOX
Type: IMPLANTABLE DEVICE | Site: KNEE | Status: FUNCTIONAL
Brand: ATTUNE

## 2022-08-01 DEVICE — ATTUNE KNEE SYSTEM FEMORAL POSTERIOR STABILIZED NARROW SIZE 5N RIGHT CEMENTED
Type: IMPLANTABLE DEVICE | Site: KNEE | Status: FUNCTIONAL
Brand: ATTUNE

## 2022-08-01 DEVICE — KNEE K1 TOT HEMI STD CEM IMPL CAPPED SYNTHES: Type: IMPLANTABLE DEVICE | Site: KNEE | Status: FUNCTIONAL

## 2022-08-01 RX ORDER — PROPOFOL 10 MG/ML
INJECTION, EMULSION INTRAVENOUS
Status: DISCONTINUED | OUTPATIENT
Start: 2022-08-01 | End: 2022-08-01 | Stop reason: HOSPADM

## 2022-08-01 RX ORDER — CEFAZOLIN SODIUM 1 G/3ML
INJECTION, POWDER, FOR SOLUTION INTRAMUSCULAR; INTRAVENOUS AS NEEDED
Status: DISCONTINUED | OUTPATIENT
Start: 2022-08-01 | End: 2022-08-01 | Stop reason: HOSPADM

## 2022-08-01 RX ORDER — MIDAZOLAM HYDROCHLORIDE 1 MG/ML
INJECTION, SOLUTION INTRAMUSCULAR; INTRAVENOUS
Status: SHIPPED | OUTPATIENT
Start: 2022-08-01 | End: 2022-08-01

## 2022-08-01 RX ORDER — KETOROLAC TROMETHAMINE 30 MG/ML
15 INJECTION, SOLUTION INTRAMUSCULAR; INTRAVENOUS
Status: DISCONTINUED | OUTPATIENT
Start: 2022-08-01 | End: 2022-08-01 | Stop reason: HOSPADM

## 2022-08-01 RX ORDER — BUPIVACAINE HYDROCHLORIDE 5 MG/ML
INJECTION, SOLUTION EPIDURAL; INTRACAUDAL
Status: SHIPPED | OUTPATIENT
Start: 2022-08-01 | End: 2022-08-01

## 2022-08-01 RX ORDER — CELECOXIB 200 MG/1
400 CAPSULE ORAL
Status: COMPLETED | OUTPATIENT
Start: 2022-08-01 | End: 2022-08-01

## 2022-08-01 RX ORDER — SODIUM CHLORIDE 0.9 % (FLUSH) 0.9 %
5-40 SYRINGE (ML) INJECTION EVERY 8 HOURS
Status: CANCELLED | OUTPATIENT
Start: 2022-08-01

## 2022-08-01 RX ORDER — ACETAMINOPHEN 325 MG/1
650 TABLET ORAL
Status: CANCELLED | OUTPATIENT
Start: 2022-08-01

## 2022-08-01 RX ORDER — SODIUM CHLORIDE 0.9 % (FLUSH) 0.9 %
5-40 SYRINGE (ML) INJECTION AS NEEDED
Status: DISCONTINUED | OUTPATIENT
Start: 2022-08-01 | End: 2022-08-01 | Stop reason: HOSPADM

## 2022-08-01 RX ORDER — SENNOSIDES 8.6 MG/1
1 TABLET ORAL 2 TIMES DAILY
Status: CANCELLED | OUTPATIENT
Start: 2022-08-01

## 2022-08-01 RX ORDER — BUPIVACAINE HYDROCHLORIDE 2.5 MG/ML
INJECTION, SOLUTION INFILTRATION; PERINEURAL AS NEEDED
Status: DISCONTINUED | OUTPATIENT
Start: 2022-08-01 | End: 2022-08-01 | Stop reason: HOSPADM

## 2022-08-01 RX ORDER — LIDOCAINE HYDROCHLORIDE 10 MG/ML
INJECTION, SOLUTION EPIDURAL; INFILTRATION; INTRACAUDAL; PERINEURAL
Status: COMPLETED | OUTPATIENT
Start: 2022-08-01 | End: 2022-08-01

## 2022-08-01 RX ORDER — SODIUM CHLORIDE, SODIUM LACTATE, POTASSIUM CHLORIDE, CALCIUM CHLORIDE 600; 310; 30; 20 MG/100ML; MG/100ML; MG/100ML; MG/100ML
25 INJECTION, SOLUTION INTRAVENOUS CONTINUOUS
Status: DISCONTINUED | OUTPATIENT
Start: 2022-08-01 | End: 2022-08-01 | Stop reason: HOSPADM

## 2022-08-01 RX ORDER — ACETAMINOPHEN 500 MG
1000 TABLET ORAL ONCE
Status: COMPLETED | OUTPATIENT
Start: 2022-08-01 | End: 2022-08-01

## 2022-08-01 RX ORDER — OXYCODONE AND ACETAMINOPHEN 5; 325 MG/1; MG/1
2 TABLET ORAL
Status: DISCONTINUED | OUTPATIENT
Start: 2022-08-01 | End: 2022-08-01 | Stop reason: HOSPADM

## 2022-08-01 RX ORDER — BUPIVACAINE HYDROCHLORIDE 7.5 MG/ML
INJECTION, SOLUTION INTRASPINAL
Status: SHIPPED | OUTPATIENT
Start: 2022-08-01 | End: 2022-08-01

## 2022-08-01 RX ORDER — ONDANSETRON 2 MG/ML
4 INJECTION INTRAMUSCULAR; INTRAVENOUS ONCE
Status: DISCONTINUED | OUTPATIENT
Start: 2022-08-01 | End: 2022-08-01 | Stop reason: HOSPADM

## 2022-08-01 RX ORDER — NALOXONE HYDROCHLORIDE 0.4 MG/ML
0.4 INJECTION, SOLUTION INTRAMUSCULAR; INTRAVENOUS; SUBCUTANEOUS AS NEEDED
Status: DISCONTINUED | OUTPATIENT
Start: 2022-08-01 | End: 2022-08-01 | Stop reason: HOSPADM

## 2022-08-01 RX ORDER — FACIAL-BODY WIPES
10 EACH TOPICAL DAILY PRN
Status: CANCELLED | OUTPATIENT
Start: 2022-08-01

## 2022-08-01 RX ORDER — ASPIRIN 325 MG
325 TABLET, DELAYED RELEASE (ENTERIC COATED) ORAL 2 TIMES DAILY
Status: CANCELLED | OUTPATIENT
Start: 2022-08-02

## 2022-08-01 RX ORDER — OXYCODONE AND ACETAMINOPHEN 10; 325 MG/1; MG/1
1 TABLET ORAL
Status: DISCONTINUED | OUTPATIENT
Start: 2022-08-01 | End: 2022-08-01 | Stop reason: HOSPADM

## 2022-08-01 RX ORDER — SODIUM CHLORIDE 0.9 % (FLUSH) 0.9 %
5-40 SYRINGE (ML) INJECTION EVERY 8 HOURS
Status: DISCONTINUED | OUTPATIENT
Start: 2022-08-01 | End: 2022-08-01 | Stop reason: HOSPADM

## 2022-08-01 RX ORDER — TRANEXAMIC ACID 100 MG/ML
INJECTION, SOLUTION INTRAVENOUS AS NEEDED
Status: DISCONTINUED | OUTPATIENT
Start: 2022-08-01 | End: 2022-08-01 | Stop reason: HOSPADM

## 2022-08-01 RX ORDER — SODIUM CHLORIDE, SODIUM LACTATE, POTASSIUM CHLORIDE, CALCIUM CHLORIDE 600; 310; 30; 20 MG/100ML; MG/100ML; MG/100ML; MG/100ML
INJECTION, SOLUTION INTRAVENOUS
Status: DISCONTINUED | OUTPATIENT
Start: 2022-08-01 | End: 2022-08-01 | Stop reason: HOSPADM

## 2022-08-01 RX ORDER — DIPHENHYDRAMINE HYDROCHLORIDE 50 MG/ML
12.5 INJECTION, SOLUTION INTRAMUSCULAR; INTRAVENOUS
Status: CANCELLED | OUTPATIENT
Start: 2022-08-01

## 2022-08-01 RX ORDER — SODIUM CHLORIDE 0.9 % (FLUSH) 0.9 %
5-40 SYRINGE (ML) INJECTION AS NEEDED
Status: CANCELLED | OUTPATIENT
Start: 2022-08-01

## 2022-08-01 RX ORDER — ONDANSETRON 2 MG/ML
4 INJECTION INTRAMUSCULAR; INTRAVENOUS
Status: DISCONTINUED | OUTPATIENT
Start: 2022-08-01 | End: 2022-08-01 | Stop reason: HOSPADM

## 2022-08-01 RX ORDER — FENTANYL CITRATE 50 UG/ML
50 INJECTION, SOLUTION INTRAMUSCULAR; INTRAVENOUS AS NEEDED
Status: COMPLETED | OUTPATIENT
Start: 2022-08-01 | End: 2022-08-01

## 2022-08-01 RX ORDER — DEXAMETHASONE SODIUM PHOSPHATE 4 MG/ML
INJECTION, SOLUTION INTRA-ARTICULAR; INTRALESIONAL; INTRAMUSCULAR; INTRAVENOUS; SOFT TISSUE
Status: SHIPPED | OUTPATIENT
Start: 2022-08-01 | End: 2022-08-01

## 2022-08-01 RX ADMIN — ACETAMINOPHEN 1000 MG: 500 TABLET ORAL at 07:15

## 2022-08-01 RX ADMIN — MIDAZOLAM HYDROCHLORIDE 4 MG: 2 INJECTION, SOLUTION INTRAMUSCULAR; INTRAVENOUS at 08:45

## 2022-08-01 RX ADMIN — SODIUM CHLORIDE, POTASSIUM CHLORIDE, SODIUM LACTATE AND CALCIUM CHLORIDE: 600; 310; 30; 20 INJECTION, SOLUTION INTRAVENOUS at 09:05

## 2022-08-01 RX ADMIN — OXYCODONE AND ACETAMINOPHEN 1 TABLET: 10; 325 TABLET ORAL at 12:32

## 2022-08-01 RX ADMIN — DEXAMETHASONE SODIUM PHOSPHATE 4 MG: 4 INJECTION, SOLUTION INTRAMUSCULAR; INTRAVENOUS at 08:50

## 2022-08-01 RX ADMIN — MIDAZOLAM HYDROCHLORIDE 4 MG: 1 INJECTION, SOLUTION INTRAMUSCULAR; INTRAVENOUS at 09:05

## 2022-08-01 RX ADMIN — CEFAZOLIN SODIUM 2 G: 1 INJECTION, POWDER, FOR SOLUTION INTRAMUSCULAR; INTRAVENOUS at 09:25

## 2022-08-01 RX ADMIN — TRANEXAMIC ACID 1 G: 1 INJECTION, SOLUTION INTRAVENOUS at 09:25

## 2022-08-01 RX ADMIN — BUPIVACAINE HYDROCHLORIDE WITH DEXTROSE 13.5 MG: 7.5 INJECTION SUBARACHNOID at 09:13

## 2022-08-01 RX ADMIN — ONDANSETRON 4 MG: 2 INJECTION INTRAMUSCULAR; INTRAVENOUS at 11:45

## 2022-08-01 RX ADMIN — KETOROLAC TROMETHAMINE 15 MG: 30 INJECTION, SOLUTION INTRAMUSCULAR at 11:54

## 2022-08-01 RX ADMIN — BUPIVACAINE HYDROCHLORIDE 20 ML: 5 INJECTION, SOLUTION EPIDURAL; INTRACAUDAL; PERINEURAL at 08:50

## 2022-08-01 RX ADMIN — LIDOCAINE HYDROCHLORIDE 5 MG: 10 INJECTION, SOLUTION EPIDURAL; INFILTRATION; INTRACAUDAL; PERINEURAL at 09:08

## 2022-08-01 RX ADMIN — FENTANYL CITRATE 50 MCG: 50 INJECTION, SOLUTION INTRAMUSCULAR; INTRAVENOUS at 11:14

## 2022-08-01 RX ADMIN — PROPOFOL 40 MCG/KG/MIN: 10 INJECTION, EMULSION INTRAVENOUS at 09:25

## 2022-08-01 RX ADMIN — CELECOXIB 400 MG: 200 CAPSULE ORAL at 07:15

## 2022-08-01 RX ADMIN — SODIUM CHLORIDE, POTASSIUM CHLORIDE, SODIUM LACTATE AND CALCIUM CHLORIDE 25 ML/HR: 600; 310; 30; 20 INJECTION, SOLUTION INTRAVENOUS at 07:17

## 2022-08-01 RX ADMIN — FENTANYL CITRATE 50 MCG: 50 INJECTION, SOLUTION INTRAMUSCULAR; INTRAVENOUS at 11:11

## 2022-08-01 NOTE — INTERVAL H&P NOTE
Update History & Physical    The Patient's History and Physical was reviewed with the patient. The patient was examined. There was no change. The surgical site was confirmed by the patient and me. Patient understands and wants to proceed with the procedure. If applicable, I have discussed with the patient / power of  the rationale for blood component transfusion; its benefits in treating or preventing fatigue, organ damage, or death; and its risk which includes mild transfusion reactions, rare risk of blood borne infection, or more serious but rare reactions. I have discussed the alternatives to transfusion, including the risk and consequences of not receiving transfusion. The patient / Lennice Necessary of  had an opportunity to ask questions and had agreed to proceed with transfusion of blood components. Plan:  The risk, benefits, expected outcome, and alternative to the recommended procedure have been discussed with the patient.       Electronically signed by FUAD Ascencio on 8/1/2022 at 7:46 AM

## 2022-08-01 NOTE — ANESTHESIA PREPROCEDURE EVALUATION
Relevant Problems   No relevant active problems       Anesthetic History   No history of anesthetic complications            Review of Systems / Medical History  Patient summary reviewed, nursing notes reviewed and pertinent labs reviewed    Pulmonary  Within defined limits                 Neuro/Psych   Within defined limits           Cardiovascular    Hypertension              Exercise tolerance: >4 METS     GI/Hepatic/Renal                Endo/Other      Hypothyroidism  Obesity, morbid obesity, arthritis and anemia     Other Findings              Physical Exam    Airway  Mallampati: II  TM Distance: 4 - 6 cm  Neck ROM: normal range of motion   Mouth opening: Normal     Cardiovascular  Regular rate and rhythm,  S1 and S2 normal,  no murmur, click, rub, or gallop  Rhythm: regular  Rate: normal         Dental  No notable dental hx       Pulmonary  Breath sounds clear to auscultation               Abdominal  GI exam deferred       Other Findings            Anesthetic Plan    ASA: 2  Anesthesia type: general - backup, regional and spinal - saphenous block      Post-op pain plan if not by surgeon: peripheral nerve block single    Induction: Intravenous  Anesthetic plan and risks discussed with: Patient

## 2022-08-01 NOTE — DISCHARGE INSTRUCTIONS
TOTAL KNEE REPLACEMENT DISCHARGE INFORMATION    You have undergone a Total Knee Replacement. The following list is to provide you with some expectations over the next week upon your discharge from the hospital.     Please begin Aspirin 81mg every 12 hours (twice daily) starting tomorrow as directed until Dr. Osvaldo Donovan instructs you to discontinue it. If you are not sure which blood thinner to take please contact Dr. Leah Corcoran office next business day for clarification. Please be sure to continue your thigh-high compression stockings on both sides until instructed to discontinue them. Over the course of the next week, you should continue thigh high stockings on the operative leg, DO NOT GET THE INCISION WET until instructed to do so. Please make sure the stockings on the operative leg are pulled up all the way to the thigh to prevent any creases which may result in abrasions or creases in the skin. If the stockings are creating creases resulting in abrasions or blistering on the operative leg please remove the stockings. You may take the stockings off on the nonoperative leg once you arrive home. You may notice some bruising on your thigh and it may extend all the way to the ankles. That is perfectly normal early on. You may experience a clicking noise in your knee and that is normal because of the artificial knee. It is important to remember if you have any surgical procedure including dental procedures which may result in bleeding that an antibiotic 1 hour before the procedure will be required. Please let the provider performing the procedure know that you have artificial joint. If an antibiotic is not given by them please call our office and give us at least 5 business days to get you the appropriate antibiotics if needed. This rule applies indefinitely. If an Ace wrap is placed on your knee you may remove the Ace wrap only 48 hours after your surgery. We will leave the stockings on.   During the course of your  over the next week, should you experience fevers of 101.5 F, a white drainage from the incision, extreme redness around the incision, or the incision begins to have a pungent smell; Please call our office or page Dr. Marci Goss whose numbers are provided in your discharge paperwork. To Page Dr. Marci Goss please call [phone] and dial [phone]. Have the  page whomever is on call for Orthopedics. These are signs of infection and it should be addressed immediately. Please do not drive until instructed to do so. If you need a refill on pain medication please allow at least 2 business days notice for any refills. Immediate refill request may not be possible. Medication refill requests will not be addressed during non-business hours. Please do not page the on-call provider for pain medication refills after hours. It is very important for you to begin your Outpatient Physical Therapy within a couple days of the day of your discharge and your appointment should have been set up. If your physical therapy has not been set up please call our office the next business day for assistance. Details provided in a separate sheet. Remove ace wrap in 48 hrs after surgery but keep stockings on. You may remove the stocking and keep the stocking off on the nonoperative leg. Finish all antibiotics, start the antibiotics as soon as you go home if you have prescribed antibiotics. 14.  You should perform your daily home exercises at least 4 times a day 30 minutes each time. Perform foot pumps on both feet at least 10 times every 15 minutes while awake. This helps prevent swelling in the leg and can help prevent blood clots in the leg. On the operative leg if you have significant swelling you can also lay down flat and put 3 pillows under the heel so the heel is above the heart level and then perform foot pumps 4 times a day for 10 minutes to help bring the swelling down.   15.  Do not place anything under your knee while sleeping at night. Elevate your heel so your  is straight while sleeping at night. 16.  Perform deep breathing exercises 10 times every hour while awake. 17.  If you had a nerve block and you are not having pain the day of the surgery, at nighttime it is okay to take 1 pain medication before going to sleep to help prevent excruciating pain when the nerve block wears off. 18.  You may be given an ice pack machine use that to help prevent swelling. Do not apply heat to the incision area. 19.  While you are awake at least 10 times every 30 minutes move your foot up and down as if you are pumping gas from both feet to help prevent swelling and to promote blood circulation in the calf. 20.  If you develop sudden onset of shortness of breath or severe calf pain please go to closest emergency room. 21. Your pain medicine is a Narcotic and may cause constipation. You may take an over the counter stool softener while taking pain medicine. 25.  You will get surveys either via text message or email after your surgery on a periodic basis. Please participate in the surveys as it helps to track your progress. ICE THERAPY WRAP:    Keep ice therapy wrap on when resting. DO not wear when moving or walking. Ice packs are reusable. Ice Therapy wrap holds two ice packs at a time. Things to watch for:             Increased swelling of the surgical site             Spreading of redness around the incision site             Drainage of pus from the incision site             Developing a fever of 101.5 °F or higher             If any of these symptoms occur you have any questions please contact our office at [phone]. If you need to talk to Dr. Radu Rodgers or his staff after hours please call the office and have the on-call service get in touch with the provider on call that day. Please note pain medications are not refilled after hours or on weekends.   If Dr. Radu Rodgers or his staff do not call you back within 30 minutes. Please tell the  to try again. Phone: [phone]  www. [employer]

## 2022-08-01 NOTE — ANESTHESIA POSTPROCEDURE EVALUATION
Procedure(s):  RIGHT TKA SEMI-CONSTRAINT. general - backup, regional, spinal    Anesthesia Post Evaluation      Multimodal analgesia: multimodal analgesia used between 6 hours prior to anesthesia start to PACU discharge  Patient location during evaluation: bedside  Patient participation: complete - patient participated  Level of consciousness: awake and alert  Pain score: 10  Pain management: adequate  Airway patency: patent  Anesthetic complications: no  Cardiovascular status: acceptable and stable  Respiratory status: acceptable and room air  Hydration status: acceptable  Comments: Ok to discharge when post op criteria met.    Post anesthesia nausea and vomiting:  none  Final Post Anesthesia Temperature Assessment:  Normothermia (36.0-37.5 degrees C)      INITIAL Post-op Vital signs:   Vitals Value Taken Time   /81 08/01/22 1105   Temp     Pulse 69 08/01/22 1105   Resp 14 08/01/22 1105   SpO2 99 % 08/01/22 1105

## 2022-08-01 NOTE — PERIOP NOTES
Don Paul, DPT in to eval pt, teach home exercises, and ambulate pt in hallway. Will continue to monitor.

## 2022-08-01 NOTE — PERIOP NOTES
Pt, daughter, and  given discharge instructions with verbalized understanding. No questions voiced. Pt getting dressed now for discharge.

## 2022-08-01 NOTE — ANESTHESIA PROCEDURE NOTES
Spinal Block    Start time: 8/1/2022 9:05 AM  End time: 8/1/2022 9:14 AM  Performed by: Zaira Carter CRNA  Authorized by: Zaira Carter CRNA     Pre-procedure: Indications: at surgeon's request and primary anesthetic  Preanesthetic Checklist: patient identified, risks and benefits discussed, anesthesia consent, site marked, patient being monitored, timeout performed and fire risk safety assessment completed and verbalized    Timeout Time: 09:05 EDT      Spinal Block:   Patient Position:  Seated  Prep Region:  Lumbar  Prep: Betadine      Location:  L2-3  Technique:  Single shot  Local: midazolam (VERSED) injection sedation - IntraVENous   4 mg - 8/1/2022 9:05:00 AM  lidocaine (PF) (XYLOCAINE) 10 mg/mL (1 %) IntraDERMAL - IntraDERMal   5 mg - 8/1/2022 9:08:00 AM  bupivacaine 0.75% in dextrose 8.25% preserv-free (SENSORCAINE) Intrathecal - Intrathecal   13.5 mg - 8/1/2022 9:13:00 AM  Local Dose (mL):  1.2  Med Admin Time: 8/1/2022 9:14 AM    Needle:   Needle Type:   Lissette  Needle Gauge:  24 G  Attempts:  1      Events: CSF confirmed, no blood with aspiration and no paresthesia        Assessment:  Insertion:  Uncomplicated  Patient tolerance:  Patient tolerated the procedure well with no immediate complications

## 2022-08-01 NOTE — OP NOTES
Operative Note    Patient: Bard Bowling MRN: [id number]  Surgery Date: 8/1/2022  [unfilled]          Procedure  Primary Surgeon    RIGHT TKA  Wojciech Mcginnis MD    * Panel 2 does not exist *  * Panel 2 does not exist *    * Panel 3 does not exist *  * Panel 3 does not exist *     Surgeon(s) and Role:     * Wojciech Mcginnis MD - Primary    Other OR Staff/Assistants:  Circ-1: Tatiana Walters RN  Scrub Tech-1: Ovi Velasquez Gutierrez  Surg Asst-1: Shannan Tee    1st Assistant Tasks:  Closing    Pre-operative Diagnosis:  Primary osteoarthritis of right knee [M17.11]    Post-operative Diagnosis: same as preop diagnosis    Anesthesia Type: Spinal     Findings: djd    Complications: No    EBL: 50 cc    Specimens: None    Implants       Implant    Cement [facility] Hv R+G 40gm -- [place] R+G [id number] - [id number] - Implanted   (Right) Knee      Inventory item: CEMENT [facility] HV R+G 40GM -- PALACOS R+G [id number] Model/Cat number: [id number]    : [employer] Lot number: [id number]      As of 8/1/2022       Status: Implanted                      Pat Anibal Dome Medial 35mm -- Attune - [id number] - Implanted   (Right) Knee      Inventory item: PAT ANIBAL DOME MEDIAL 35MM -- ATTUNE Model/Cat number: [id number]    : [employer] Lot number: [id number]    Device identifier: [id number] Device identifier type: GS1      GUDID Information       Request status Successful        Brand name: ATTUNE Version/Model: 1518-    Company name: Dwayne Peterson ([place]) MRI safety info as of 8/1/22: Labeling does not contain MRI Safety Information    Contains dry or latex rubber: No      GMDN P.T. name: Polyethylene patella prosthesis                As of 8/1/2022       Status: Implanted                      Insert Tib Sz 5 Thk6mm Knee Post Stbl Rot Platfrm Attune - [id number] - Implanted   (Right) Knee      Inventory item: INSERT TIB SZ 5 THK6MM KNEE POST STBL ROT PLATFRM ATTUNE Model/Cat number: [id number]    : [employer] Lot number: [id number]    Device identifier: [id number] Device identifier type: [facility]      GUDID Information       Request status Successful        Brand name: eBoox Version/Model: 7578-    Company name: Margarita Vitale ([employer]) MRI safety info as of 8/1/22: Labeling does not contain MRI Safety Information    Contains dry or latex rubber: No      GMDN P.T. name: Tibial insert                As of 8/1/2022       Status: Implanted                      Component Fem Sz 5 R Knee Ashwin Post Stbl Anibal Attune - [id number] - Implanted   (Right) Knee      Inventory item: COMPONENT FEM SZ 5 R KNEE ASHWIN POST STBL ANIBAL ATTOink Model/Cat number: [id number]    : [employer] Lot number: [id number]    Device identifier: [id number] Device identifier type: [facility]      StyleSeat Information       Request status Successful        Brand name: eBoox Version/Model: 1504-    Company name: Margarita Vitale [email or web address]) MRI safety info as of 8/1/22: Labeling does not contain MRI Safety Information    Contains dry or latex rubber: No      GMDN P.T. name: Uncoated knee femur prosthesis, metallic                As of 8/1/2022       Status: Implanted                      Baseplate Tib Sz 5 Rot Platfrm Co Chrom Molybdenum Ti Donovan - [id number] - Implanted   (Right) Knee      Inventory item: BASEPLATE TIB SZ 5 ROT PLATFRM CO CHROM MOLYBDENUM TI ALLY Model/Cat number: [id number]    : [employer] Lot number: [id number]    Device identifier: [id number] Device identifier type: 1      GUDID Information       Request status Successful        Brand name: eBoox Version/Model: 1506-    Company name: Margarita Vitale ([employer]) MRI safety info as of 8/1/22: Labeling does not contain MRI Safety Information    Contains dry or latex rubber: No      GMDN P.T. name: Uncoated knee tibia prosthesis, metallic                As of 8/1/2022       Status: Implanted                               Operative procedure: Total knee replacement    OPERATIVE PROCEDURE:  Please note the first assistant role was to help in patient positioning and draping of the extremity in a sterile fashion. Also during the surgery the assistant's responsibilities included but not limited to extremity positioning during critical portions of the surgery. Assisting in using and placement of retractors during surgery. Lower extremity was prepped and draped in a sterile fashion. After adequate anesthesia was given, the patient was placed in a well-padded supine position. Subvastus arthrotomy from the tibial tubercle to the superior pole of the patella was made. Knee was hyperflexed. Intramedullary reaming of distal femur and proximal tibia was performed. 10 mm of distal femur was cut. Anterior-posterior sizing guide was used. Anterior, posterior, chamfer cuts, and box cuts were made next. Proximal tibial cut and preparation performed. Posterior osteophyte meniscal remnants were removed, and also patella was everted. Free-hand cut of the patella was made. Trial components were placed. The patient was found to have excellent range of motion and stability with all trial components. All the trial components removed. Copious irrigation performed. Distal femur, proximal tibia, and patella were impacted in place. Excessive cement was removed. After the cement was hard, Subvastus arthrotomy closed with Vicryl stitch. Compressive dressing was applied. The patient was taken to PACU in stable condition. Please note due to the patient's BMI of greater than 30 significant surgical effort was required compared to the standard patient with a BMI lower than 30. Surgical time increased approximately 30% from the normal surgical time due to the patient's high BMI.   Because of the high BMI patient's knee would be considered a complex total knee replacement rather than a standard total knee replacement.       Kym Johnston MD

## 2022-08-01 NOTE — PERIOP NOTES
Pt educated on [employer] with verbalized understanding. No questions voiced. Pt did return demonstration and got up to 1500 ml on IS.

## 2022-08-01 NOTE — ANESTHESIA PROCEDURE NOTES
Peripheral Block    Start time: 8/1/2022 8:45 AM  End time: 8/1/2022 8:51 AM  Performed by: Karlos Singleton CRNA  Authorized by: Karlos Singleton CRNA       Pre-procedure: Indications: at surgeon's request and post-op pain management    Preanesthetic Checklist: patient identified, risks and benefits discussed, site marked, timeout performed, anesthesia consent given, patient being monitored and fire risk safety assessment completed and verbalized    Timeout Time: 08:45 EDT ([facility])      Block Type:   Block Type:   Adductor canal block  Laterality:  Right  Monitoring:  Standard ASA monitoring, continuous pulse ox, heart rate, frequent vital sign checks, oxygen and responsive to questions  Injection Technique:  Single shot  Procedures: ultrasound guided    Patient Position: supine  Prep: chlorhexidine    Location:  Mid thigh  Needle Type:  Ultraplex  Needle Gauge:  20 G  Needle Localization:  Ultrasound guidance  Medication Injected:  Midazolam (VERSED) injection - IntraVENous   4 mg - 8/1/2022 8:45:00 AM  bupivacaine (PF) (MARCAINE) 0.5% injection - Peripheral Nerve Block   20 mL - 8/1/2022 8:50:00 AM  dexamethasone (DECADRON) 4 mg/mL injection - Peripheral Nerve Block   4 mg - 8/1/2022 8:50:00 AM  Med Admin Time: 8/1/2022 8:50 AM    Assessment:  Number of attempts:  1  Injection Assessment:  Incremental injection every 5 mL, no paresthesia, ultrasound image on chart, local visualized surrounding nerve on ultrasound, negative aspiration for blood and no intravascular symptoms  Patient tolerance:  Patient tolerated the procedure well with no immediate complications

## 2022-08-02 ENCOUNTER — HOSPITAL ENCOUNTER (OUTPATIENT)
Dept: PHYSICAL THERAPY | Age: 62
Discharge: HOME | End: 2022-08-02
Payer: COMMERCIAL

## 2022-08-02 PROCEDURE — 97162 PT EVAL MOD COMPLEX 30 MIN: CPT

## 2022-08-02 PROCEDURE — 97110 THERAPEUTIC EXERCISES: CPT

## 2022-08-02 NOTE — PROGRESS NOTES
[address] [address] [facility] [address] [facility], [address] [place] [address]  Ph: [phone]    Fax: [phone]    Initial Evaluation/Plan of Care/Statement of Necessity for Physical Therapy Services     Patient name: Francia Johnson    Date/Start of Care:2022  : 1960  [x]  Patient  Verified  Provider#: [id number]          Referral source: Wendie Wade MD    Return visit to MD: virtual follow up    Medical/Treatment Diagnosis: Pain in right knee [M25.561]    Payor: BLUE CROSS / Plan: Joann Stanton / Product Type: DEREK /       Prior Hospitalization: see medical history     Comorbidities: see chart   Prior Level of Function: uses a RW upon arrival to [facility]   Medications: Verified on Patient Summary List          Patient / Family readiness to learn indicated by: asking questions, trying to perform skills, and interest  Persons(s) to be included in education: patient (P)  Barriers to Learning/Limitations: None  Patient Self Reported Health Status: good  Rehabilitation Potential: good  Previous Treatment/Compliance: none  PMHx/Surgical Hx: OA, high BP, thyroid problems (surgical history see chart)   Work Hx: work full time. Right now she is off work due to surgery   Living Situation: Patient with  in a house with 3 steps to enter and no steps inside. Barriers to progress: acute of pain  Motivation: good   Substance use: N/A  Cognition: A & O x 4  Onset Date:    SUBJECTIVE  Patient was referred to PT s/p R TKR on  secondary to OA. Patient c/o pain along her incision and front of knee joint. She c/o constant pain, achyness that if she does not take any medication it builds up and very painful. She reports increased stiffness and difficulty bending the R knee. She report no numbness or tingling sensation. Activities that produce the pain are getting up from a chair, standing, walking, getting in/out of bed/car and going up/down the steps.  She reports some relief with sitting and laying down. Patient reports functional limiations with walking, doing her ADLs, standing and cooking, sit to stand transitions, driving and walking. Mechanism of Injury/History of Complaint: surgery   Area of pain:  medical joint line   Pain Descriptors:achyness   Pain Level (0-10 scale)  At rest: 4/10 With activity: 6/10    Worst: 10/10    Least: 4/10   Goal: \"To be able to walk without assistance and pain and be able to drive again\". Objective/Functional Measures including ROM/MMT:   Physical Findings   Ortho:   Posture: Wide JELLY, b feet ER, R knee slight bent   Gait and Functional Mobility:  step to  antalgic gait pattern   WBS:  Inc WB on (L) LE   Palpation: TTP on R calf, medial joint line   Swelling: Mod swelling   R Mid knee joint 46 cm / L knee joint 42 cm     Gross findings:  B compression stocking and bandage around the knee joint. Specific joints: *normal values in ()  KNEE        AROM          PROM                       MMT   R L R L R L   Extension (0)  -19  -1   2+ p!  4p!knee joint    Flexion (145) 59  112   3-  4   Patellar Mobility:  TTP - wrapped with bandage   SLR - unable to initiate a  SLR on R LE yet   Bridge - initiates Left LE and R LE in more extension moment    Additional comments:  HIP     AROM       PROM             MMT   R L R L R L   Flexion (120)     2+p! 4   Extension (15)         Abduction (40)     3- p! Adduction (30)         IR (40)         ER (40)         Additional comments: unable to lay prone or on the R side due to pain     ANKLE                               AROM                     PROM                     MMT:   R L R L R L   Dorsiflexion (15)      4+ 4+    Plantarflexion (50)         Inversion (35)          Eversion (25)         Additional comments:     Mobility Assessment: limited ambulation, standing and negotiating stairs.       Gait and Functional Mobility:  Transfers:   Bed mobility: independent with increased time and effort and noted increased difficulty extending R LE on the bed  Sit to/from Supine: independent with increased time and effort unable to lay prone due to pain. Sit to/from stands: Mod ind, required UE support and was extending R LE upon sitting - educated on sit to stand technique. Gait: slow , step to antalgic gait pattern  Assistive device:  RW   Gait speed:   TUG test: 25 sec with RW  Stairs: NT      Neurological: Reflexes / Sensations: intact to light touch   Special Tests: N/A     Sitting Balance: (unsupported)  Static:      [x] Normal [] Good [] Fair [] Poor   Dynamic: [x] Normal [] Good [] Fair [] Poor    Standing Balance:   Static:     [] Normal [] Good [x] Fair [] Poor   Dynamic: [] Normal [] Good [] Fair [] Poor    Modified Clinical Test of Sensory Interaction (CTSIB-M):    Eyes open/firm surface: 2 min with RW c/o R knee pain / 31 sec without holding on to RW reports increased pain    Eyes closed/firm surface: 10 sec without UE support        Tandem Stand: (eyes open/eyes closed)   R foot forward: unable to perform due to pain in standing   L foot forward: unable to perform due to pain in standing    Single limb stand:   R: unable to perform due to pain in standing  L: unable to perform due to pain in standing     Five times sit to stand:       5 reps = 35 sec with UE support on mat - unable to stand without UE support today. Normative averages:  Clients younger than 61years old  £  10 seconds = Normal   Clients older than 61years old £ 14.2 seconds = Normal   Change of ³ 2.3 seconds shows a significant clinical improvement    Pooja RW. Reference values for the five repetition sit to stand test: a descriptive metaanalysis of data from elders. Percept Mot Skills 2006; 103(1):215-222. Ampa Score:   49.92%   ASSESSMENT/Changes in Function:   Patient is a 56 y/o female s/p R TKR on 8/1/22.  Patient presents to clinic with antalgic gait pattern, ambulating with RW, with decreased R knee A/PROM , decreased LE strength, decreased balance, decreased flexibility and strength and gait impairments. Patient will benefit from skilled PT services to address above. Problem List/Impairments: pain affecting function, decrease ROM, decrease strength, edema affecting function, impaired gait/ balance, decrease ADL/ functional abilitiies, decrease activity tolerance, decrease flexibility/ joint mobility, and decrease transfer abilities  Treatment Plan may include any combination of the following: Therapeutic exercise, Neuromuscular re-education, Physical agent/modality, Gait/balance training, Manual therapy, Patient education, Functional mobility training, and Stair training  Patient/ Caregiver education and instruction: exercises  Frequency / Duration: Patient to be seen 2-3 times per week for 18-24 treatments. Certification Period: 8/2/22-11/2/22  Patient Goal (s): To be able to walk without assistance and pain and be able to drive again    [] Met [] Not met [] Partially met   Short Term Goals: To be accomplished in 8 treatments. Patient will be independent with her HEP to progress with POC. [] Met [] Not met [] Partially met   Patient pain level will subside to <=4/10 with activity. [] Met [] Not met [] Partially met   Patient knee AROM will improve by 10 -15 deg to improve sit to stand transfers. [] Met [] Not met [] Partially met   Patient will be able to perform a SLR with decreased knee pain and lag. [] Met [] Not met [] Partially met     Long Term Goals: To be accomplished in 18-24  treatments. 1. Pt will have improved AMPAC score by 5%. [] Met [] Not met [] Partially met     2. Patient knee AROM will be >= 112 deg to equal her left LE . [] Met [] Not met [] Partially met     3. Patient strength will improve by 1/2 to 1 grade to improve stability . [] Met [] Not met [] Partially met     4. Patient will be able to perform a SLS on R LE 5-10 sec to improve balance and joint stability.  [] Met [] Not met [] Partially met     5. Patient will be able to negotiate >4 (6'') steps with 1 rail, no device and STS/SOS ascending/descending. [] Met [] Not met [] Partially met      6. Patient can ambulate community distances with cane or no  assistive device and without knee pain or instability. [] Met [] Not met [] Partially met     4) Patient can get up after sitting for >/= 30 minutes without difficulty or stumbling. [] Met [] Not met [] Partially met     7) Pt will be able to squat to retrieve item from ground without increase of pain. [] Met [] Not met [] Partially met     8) Pt will have decreased swelling by 1/2 cm to improve flexibility and decrease stiffness. [] Met [] Not met [] Partially met      TODAY'S TREATMENT: EVALUATION completed                  EVALUATION COMPLEXITY (Low, Moderate, High): mod  Visit #: 1/ 18-24   In time:11:35   Out time: 12:40  Total Treatment Time (min):65   Total Timed Codes (min): 10   Pain Level (0-10 scale) pre treatment: 4/10   Pain Level (0-10 scale) post treatment: 3/10   Modality rationale: decrease edema, decrease inflammation, decrease pain, increase tissue extensibility, and increase muscle contraction/control to improve the patients ability to ambulate with no pain and do her ADLs without pain/discomfort.     Min Type Additional Details    [] Estim: []UnAtt   []Att       []TENS instruct                  []IFC  []Premod   []NMES []w/US   []w/ice   []w/heat  Position:                                     Location:   10 [x]  Ice     []  Heat  []  Ice massage Position:sitting leg extended   Location:R knee     []  Traction: [] Cervical       []Lumbar                       []Intermittent   []Continuous                     Lbs:  []W/heat               []W/heat and Estim    []  Ultrasound: []Continuous   [] Pulsed at:                           []1MHz   []3MHz Location:  W/cm2:    [] Skin assessment post-treatment:  []intact        []redness- no adverse reaction []redness - adverse reaction:   10 min Therapeutic Exercise:  [] See flow sheet :   Rationale: increase ROM, increase strength, improve coordination, improve balance, and increase proprioception to improve the patients ability to reduce pain with ambulation and ADLs. With   [x] TE   [] TA   [] Neuro   [] SC   [] other: Patient Education: [x] Review HEP    [] Progressed/Changed HEP based on:   [] positioning   [] body mechanics   [] transfers   [] heat/ice application    [] other:        [x]  Plan of care has been reviewed with PTA. The Plan of Care is based on information from the initial evaluation. Kaye Mitchell, PT, DPT    8/2/2022   ________________________________________________________________________    I certify that the above Therapy Services are being furnished while the patient is under my care. I agree with the treatment plan and certify that this therapy is necessary.     Physician's Signature:_________________________________________________  Date:____________Time: ____________     Matty Mendosa MD

## 2022-08-02 NOTE — PROGRESS NOTES
Problem: Mobility Impaired (Adult and Pediatric)  Goal: *Acute Goals and Plan of Care (Insert Text)  Description: Pt is MOD (I)  with gait and navigates stairs with MOD (I) . PLOF: Limited community ambulator, [place], (I) ADLs. Outcome: Resolved/Met     Problem: Patient Education: Go to Patient Education Activity  Goal: Patient/Family Education  Outcome: Resolved/Met   PHYSICAL THERAPY EVALUATION AND DISCHARGE    Patient: Tavia Valenzuela (63 y.o. female)  Date: 8/1/2022  Primary Diagnosis: Primary osteoarthritis of right knee [M17.11]  OA (osteoarthritis) of knee [M17.10]  Procedure(s) (LRB):  RIGHT TKA SEMI-CONSTRAINT (Right)   Precautions:  WBAT    ASSESSMENT :  Based on the objective data described below, the patient presents s/p R TKA and she is WBAT. She is able to ambulate with a RW with MOD (I) and she is able to navigate stairs with MOD (I). She is educated on a HEP and tolerates well. She can return home with outpatient P.T. Patient does not require further skilled intervention at this level of care. PLAN :  Recommendations and Planned Interventions:   No formal PT needs identified at this time. Discharge Recommendations: Outpatient  Further Equipment Recommendations for Discharge: RW     SUBJECTIVE:   Patient states my knee is feeling a little better.     OBJECTIVE DATA SUMMARY:     Past Medical History:   Diagnosis Date    Anemia     Arthritis     HTN (hypertension)     PCOS (polycystic ovarian syndrome)     Sarcoidosis 1981    Thyroid condition      Past Surgical History:   Procedure Laterality Date    HX CHOLECYSTECTOMY  1984    HX HYSTERECTOMY  2001     Barriers to Learning/Limitations: None  Compensate with: N/A  Home Situation:   Home Situation  Home Environment: Private residence  # Steps to Enter: 3  Rails to Enter: Yes  Hand Rails : Bilateral  One/Two Story Residence: One story  Living Alone: No  Support Systems: Spouse/Significant Other  Patient Expects to be Discharged to[de-identified] Home with outpatient services  Current DME Used/Available at Home: Cane, straight  Critical Behavior:  A&O x4         08/01/22 1431   Gross Assessment   Gross Assessment Yes   AROM Generally decreased, functional  (R knee 12-55)   PROM Generally decreased, functional  (R knee 8-68)   Strength Generally decreased, functional  (R knee 4/5, SLR 1130, 2.25 hours after spinal)   Coordination Within functional limits   Tone Normal   Sensation Impaired  (pelvic area still slightly numb)   Bed Mobility   Rolling Modified independent   Supine to Sit Modified independent   Sit to Supine Modified independent   Scooting Modified independent   Posture   Posture (WDL) WDL   Balance   Sitting Intact   Standing Intact   Transfers   Sit to Stand Modified independent   Stand to Sit Modified independent   Other SBA for toileting   Gait   Gait Description (WDL) X   Ambulation - Level of Assistance Modified independent   Distance (ft) 200 Feet (ft)  (plus another 30')   Assistive Device Walker, rolling   Stairs - Level of Assistance Modified independent   Number of Stairs Trained 5  (nonreciprocating)   Rail Use Both   Gait Abnormalities Antalgic   Weight Bearing Status   Right Side Weight Bearing As tolerated      AM-PAC:   24-CH  Today's TX:   Pt is able to ambulate with MOD (I)  with a RW. She is able to navigate stairs with MOD (I). She is educated on a HEP and states understanding. Pain:  Pain level pre-treatment: 8/10   Pain level post-treatment: 7/10  Pain Location: R knee  Pain Intervention(s): Medication (see MAR); Rest, Ice, Repositioning   Response to intervention: Nurse notified, See doc flow    Activity Tolerance:   Good  Please refer to the flowsheet for vital signs taken during this treatment.   After treatment:   []         Patient left in no apparent distress sitting up in chair  [x]         Patient left in no apparent distress in bed  []         Call bell left within reach  [x]         Nursing notified  [] Caregiver present  []         Bed alarm activated  []         SCDs applied    COMMUNICATION/EDUCATION:   [x]         Role of Physical Therapy in the acute care setting. [x]         Fall prevention education was provided and the patient/caregiver indicated understanding. [x]         Patient/family have participated as able in goal setting and plan of care. [x]         Patient/family agree to work toward stated goals and plan of care. []         Patient understands intent and goals of therapy, but is neutral about his/her participation. []         Patient is unable to participate in goal setting/plan of care: ongoing with therapy staff.  []         Other:     Thank you for this referral.  Madhavi Benz, PT, DPT  Time calculation: 33 minutes

## 2022-08-03 NOTE — THERAPY EVALUATION
[address]  Ph: [phone]    Fax: [phone]     Initial Evaluation/Plan of Care/Statement of Necessity for Physical Therapy Services     Patient name: Tavia Valenzuela                                                Date/Start of Care:2022  : 1960           [x]  Patient  Verified                          Provider#: [id number]          Referral source: Niru Arora MD                                           Return visit to MD: virtual follow up             Medical/Treatment Diagnosis: Pain in right knee [M25.561]                       Payor: BLUE CROSS / Plan: [address] / Product Type: DEREK /                                                   Prior Hospitalization: see medical history                                      Comorbidities: see chart   Prior Level of Function: uses a RW upon arrival to [facility]   Medications: Verified on Patient Summary List                                                                                  Patient / Family readiness to learn indicated by: asking questions, trying to perform skills, and interest  Persons(s) to be included in education: patient (P)  Barriers to Learning/Limitations: None  Patient Self Reported Health Status: good  Rehabilitation Potential: good  Previous Treatment/Compliance: none  PMHx/Surgical Hx: OA, high BP, thyroid problems (surgical history see chart)   Work Hx: work full time. Right now she is off work due to surgery   Living Situation: Patient with  in a house with 3 steps to enter and no steps inside. Barriers to progress: acute of pain  Motivation: good   Substance use: N/A  Cognition: A & O x 4  Onset Date:                SUBJECTIVE  Patient was referred to PT s/p R TKR on  secondary to OA. Patient c/o pain along her incision and front of knee joint.  She c/o constant pain, achyness that if she does not take any medication it builds up and very painful. She reports increased stiffness and difficulty bending the R knee. She report no numbness or tingling sensation. Activities that produce the pain are getting up from a chair, standing, walking, getting in/out of bed/car and going up/down the steps. She reports some relief with sitting and laying down. Patient reports functional limiations with walking, doing her ADLs, standing and cooking, sit to stand transitions, driving and walking. Mechanism of Injury/History of Complaint: surgery   Area of pain:  medical joint line              Pain Descriptors:achyness   Pain Level (0-10 scale)              At rest: 4/10      With activity: 6/10           Worst: 10/10     Least: 4/10  Goal: \"To be able to walk without assistance and pain and be able to drive again\". Objective/Functional Measures including ROM/MMT:   Physical Findings  Ortho:  Posture: Wide JELLY, b feet ER, R knee slight bent   Gait and Functional Mobility:  step to  antalgic gait pattern  WBS:  Inc WB on (L) LE   Palpation: TTP on R calf, medial joint line   Swelling: Mod swelling  R Mid knee joint 46 cm / L knee joint 42 cm      Gross findings:  B compression stocking and bandage around the knee joint. Specific joints: *normal values in ()  KNEE                    AROM                          PROM                           MMT    R L R L R L   Extension (0)  -19 -1     2+ p! 4p!knee joint    Flexion (145) [phone]     3- 4   Patellar Mobility:  TTP - wrapped with bandage  SLR - unable to initiate a  SLR on R LE yet  Bridge - initiates Left LE and R LE in more extension moment     Additional comments:  HIP                                AROM                            PROM                              MMT    R L R L R L   Flexion (120)         2+p! 4   Extension (15)               Abduction (40)         3- p!       Adduction (30)               IR (40)               ER (40)               Additional comments: unable to lay prone or on the R side due to pain      ANKLE                               AROM                         PROM                         MMT:    R L R L R L   Dorsiflexion (15)          4+ 4+    Plantarflexion (50)               Inversion (35)               Eversion (25)               Additional comments:     Mobility Assessment: limited ambulation, standing and negotiating stairs. Gait and Functional Mobility:  Transfers:  Bed mobility: independent with increased time and effort and noted increased difficulty extending R LE on the bed  Sit to/from Supine: independent with increased time and effort unable to lay prone due to pain. Sit to/from stands: Mod ind, required UE support and was extending R LE upon sitting - educated on sit to stand technique.    Gait: slow , step to antalgic gait pattern  Assistive device:  RW   Gait speed:  TUG test: 25 sec with RW  Stairs: NT                            Neurological: Reflexes / Sensations: intact to light touch   Special Tests: N/A                Sitting Balance: (unsupported)  Static:      [x] Normal [] Good [] Fair [] Poor  Dynamic: [x] Normal [] Good [] Fair [] Poor     Standing Balance:   Static:      [] Normal [] Good [x] Fair [] Poor  Dynamic: [] Normal [] Good [] Fair [] Poor     Modified Clinical Test of Sensory Interaction (CTSIB-M):              Eyes open/firm surface: 2 min with RW c/o R knee pain / 31 sec without holding on to RW reports increased pain               Eyes closed/firm surface: 10 sec without UE support                    Tandem Stand: (eyes open/eyes closed)              R foot forward: unable to perform due to pain in standing              L foot forward: unable to perform due to pain in standing     Single limb stand:              R: unable to perform due to pain in standing             L: unable to perform due to pain in standing      Five times sit to stand:         5 reps = 35 sec with UE support on mat - unable to stand without UE support today. Normative averages:  Clients younger than 61years old  £  10 seconds = Normal  Clients older than 61years old £ 14.2 seconds = Normal  Change of ³ 2.3 seconds shows a significant clinical improvement     Pooja RW. Reference values for the five repetition sit to stand test: a descriptive metaanalysis of data from elders. Percept Mot Skills 2006; 103(1):215-222. [facility] Score:   49.92%   ASSESSMENT/Changes in Function:   Patient is a 56 y/o female s/p R TKR on 8/1/22. Patient presents to clinic with antalgic gait pattern, ambulating with RW, with decreased R knee A/PROM , decreased LE strength, decreased balance, decreased flexibility and strength and gait impairments. Patient will benefit from skilled PT services to address above. Problem List/Impairments: pain affecting function, decrease ROM, decrease strength, edema affecting function, impaired gait/ balance, decrease ADL/ functional abilitiies, decrease activity tolerance, decrease flexibility/ joint mobility, and decrease transfer abilities  Treatment Plan may include any combination of the following: Therapeutic exercise, Neuromuscular re-education, Physical agent/modality, Gait/balance training, Manual therapy, Patient education, Functional mobility training, and Stair training  Patient/ Caregiver education and instruction: exercises  Frequency / Duration: Patient to be seen 2-3 times per week for 18-24 treatments. Certification Period: 8/2/22-11/2/22  Patient Goal (s): To be able to walk without assistance and pain and be able to drive again    [] Met [] Not met [] Partially met  Short Term Goals: To be accomplished in 8 treatments. Patient will be independent with her HEP to progress with POC. [] Met [] Not met [] Partially met  Patient pain level will subside to <=4/10 with activity. [] Met [] Not met [] Partially met  Patient knee AROM will improve by 10 -15 deg to improve sit to stand transfers.  [] Met [] Not met [] Partially met  Patient will be able to perform a SLR with decreased knee pain and lag. [] Met [] Not met [] Partially met     Long Term Goals: To be accomplished in 18-24  treatments. 1. Pt will have improved AMPAC score by 5%. [] Met [] Not met [] Partially met     2. Patient knee AROM will be >= 112 deg to equal her left LE . [] Met [] Not met [] Partially met     3. Patient strength will improve by 1/2 to 1 grade to improve stability . [] Met [] Not met [] Partially met     4. Patient will be able to perform a SLS on R LE 5-10 sec to improve balance and joint stability. [] Met [] Not met [] Partially met     5. Patient will be able to negotiate >4 (6'') steps with 1 rail, no device and STS/SOS ascending/descending. [] Met [] Not met [] Partially met     6. Patient can ambulate community distances with cane or no  assistive device and without knee pain or instability. [] Met [] Not met [] Partially met     4) Patient can get up after sitting for >/= 30 minutes without difficulty or stumbling. [] Met [] Not met [] Partially met     7) Pt will be able to squat to retrieve item from ground without increase of pain. [] Met [] Not met [] Partially met     8) Pt will have decreased swelling by 1/2 cm to improve flexibility and decrease stiffness.       [] Met [] Not met [] Partially met       TODAY'S TREATMENT:       EVALUATION completed                  EVALUATION COMPLEXITY (Low, Moderate, High): mod  Visit #: 1/ 18-24  In time:11:35              Out time: 12:40          Total Treatment Time (min):65           Total Timed Codes (min): 10              Pain Level (0-10 scale) pre treatment: 4/10                                   Pain Level (0-10 scale) post treatment: 3/10          Modality rationale: decrease edema, decrease inflammation, decrease pain, increase tissue extensibility, and increase muscle contraction/control to improve the patients ability to ambulate with no pain and do her ADLs without pain/discomfort. Min Type Additional Details     [] Estim: []UnAtt   []Att       []TENS instruct                  []IFC  []Premod   []NMES []w/US   []w/ice   []w/heat  Position:                                     Location:   10 [x]  Ice     []  Heat  []  Ice massage Position:sitting leg extended   Location:R knee      []  Traction: [] Cervical       []Lumbar                       []Intermittent   []Continuous                     Lbs:  []W/heat               []W/heat and Estim     []  Ultrasound: []Continuous   [] Pulsed at:                           []1MHz   []3MHz Location:  W/cm2:    [] Skin assessment post-treatment:  []intact        []redness- no adverse reaction     []redness - adverse reaction:   10 min Therapeutic Exercise:  [] See flow sheet :   Rationale: increase ROM, increase strength, improve coordination, improve balance, and increase proprioception to improve the patients ability to reduce pain with ambulation and ADLs. With   [x] TE   [] TA   [] Neuro   [] SC   [] other: Patient Education: [x] Review HEP    [] Progressed/Changed HEP based on:  [] positioning   [] body mechanics   [] transfers   [] heat/ice application    [] other:         [x]  Plan of care has been reviewed with PTA. The Plan of Care is based on information from the initial evaluation. Kaye Mitchell, PT, DPT    8/2/2022   ________________________________________________________________________     I certify that the above Therapy Services are being furnished while the patient is under my care. I agree with the treatment plan and certify that this therapy is necessary.      Physician's Signature:_________________________________________________  Date:____________Time: ____________     Amie Acosta MD

## 2022-08-04 ENCOUNTER — APPOINTMENT (OUTPATIENT)
Dept: PHYSICAL THERAPY | Age: 62
End: 2022-08-04
Payer: COMMERCIAL

## 2022-08-08 ENCOUNTER — VIRTUAL VISIT (OUTPATIENT)
Dept: ORTHOPEDIC SURGERY | Age: 62
End: 2022-08-08
Payer: COMMERCIAL

## 2022-08-08 DIAGNOSIS — Z96.651 STATUS POST RIGHT KNEE REPLACEMENT: Primary | ICD-10-CM

## 2022-08-08 PROCEDURE — 99024 POSTOP FOLLOW-UP VISIT: CPT | Performed by: NURSE PRACTITIONER

## 2022-08-08 RX ORDER — OXYCODONE AND ACETAMINOPHEN 5; 325 MG/1; MG/1
1 TABLET ORAL
Qty: 30 TABLET | Refills: 0 | Status: SHIPPED | OUTPATIENT
Start: 2022-08-08 | End: 2022-08-22

## 2022-08-08 NOTE — PROGRESS NOTES
Subjective:   Patient presents for postop care following right TKA. Surgery was on 8/1/2022. Ambulating goodindependently. Pain is controlled with current analgesics. Medication(s) being used: Percocet. .    Objective: There were no vitals taken for this visit. General:  alert, cooperative, no distress, appears stated age   ROM: /; good quad strength on extension   Incision:   healing well, no drainage, no erythema, incision well approximated, mild swelling     Assessment:     Doing well postoperatively. Plan:     1. Continue PT. 2. Wound care/showering discussed. 3. Continue DVT prophylaxis as directed. 4. Follow up at 1 yr with Dr. Sudeep Ritchie for xray's of the right knee and as needed. Nora Al, who was evaluated through a synchronous (real-time) audio-video encounter, and/or her healthcare decision maker, is aware that it is a billable service, with coverage as determined by her insurance carrier. She provided verbal consent to proceed: Yes, and patient identification was verified. It was conducted pursuant to the emergency declaration under the [facility], [address] [place] [address] authority and the [employer] and [employer] General Act. A caregiver was present when appropriate. Ability to conduct physical exam was limited. I was in the office. The patient was at home.

## 2022-08-10 ENCOUNTER — HOSPITAL ENCOUNTER (OUTPATIENT)
Dept: PHYSICAL THERAPY | Age: 62
Discharge: HOME | End: 2022-08-10
Payer: COMMERCIAL

## 2022-08-10 PROCEDURE — 97110 THERAPEUTIC EXERCISES: CPT

## 2022-08-10 PROCEDURE — 97140 MANUAL THERAPY 1/> REGIONS: CPT

## 2022-08-10 NOTE — PROGRESS NOTES
PT DAILY TREATMENT NOTE     Patient Name: Leah Strickland  HOOO:  : 1960  [x]  Patient  Verified  Payor: Chace Wyatt / Plan: [address] / Product Type: DEREK /    Treatment Area: Pain in right knee [M25.561]   Next MD APPT: in a year  In time: 2:05  Out time: 3:20  Total Treatment Time (min): 75  Total Timed Codes (min): 60  1:1 Treatment Time ( only): 60   Visit #: 2    SUBJECTIVE    Any medication changes, allergies to medications, adverse drug reactions, diagnosis change, or new procedure performed?:   [x] No    [] Yes (see summary sheet for update)    Pain Level (0-10 scale) pre treatment: 2/10                    Pain Level (0-10 scale) post treatment: 1-2/10    Subjective functional status/changes:   [] No changes reported  Patient reports about 2/10 pain today, stated she had a virtual session with the PA they had her remove the bandage and stated it look good. Also stated her BP been dropping when she exercises and she feels lightheaded when she does the exercises and he back been hurting. She reports decreased sensation on the lateral joint line. Patient arrived with a cane today, was able to ambulate in clinic without cane. We noted increased brushing around th knee joint, hardening skin and increased swelling. OBJECTIVE    Modality rationale: decrease edema, decrease inflammation, decrease pain, increase tissue extensibility, and increase muscle contraction/control to improve the patients ability to ambulate with decreased pain.    Min Type Additional Details    [] Estim: []UnAtt   []Att       []TENS instruct                  []IFC  []Premod   []NMES                     []Other:  []w/US   []w/ice   []w/heat  Position:  Location:   15 [x]  Ice     []  Heat  []  Ice massage Position:  Location:    []  Traction: [] Cervical       []Lumbar                       [] Prone          []Supine                       []Intermittent   []Continuous Lbs:  []w/heat  []W/heat and Estim []  Ultrasound: []Continuous   [] Pulsed at:                           []1MHz   []3MHz Location:  W/cm2:      [x] Skin assessment post-treatment:   [x]intact  []redness- no adverse reaction   []redness - adverse reaction:     45 min Therapeutic Exercise:  [x] See flow sheet :   Rationale: increase ROM, increase strength, improve coordination, improve balance, and increase proprioception to improve the patients ability to ambulate with less pain. 15 min Manual Therapy: effleurage massage and gentle Scar tissue massage    Rationale: decrease pain, increase ROM, increase tissue extensibility, decrease edema , and decrease trigger points to improve the patients ability to ambulate with less pain. With   [x] TE   [] TA   [] Neuro   [] SC   [] other: Patient Education: [x] Review HEP    [] Progressed/Changed HEP based on:   [] positioning   [] body mechanics   [] transfers   [] Use of heat/ice    [] other:          Other Objective/Functional Measures:   L knee flexion AROM 90 deg   L knee extension -9 deg      ASSESSMENT/Changes in Function:   POC was initiated, patient tolerated session well, we noted some improvement in knee AROM but patient continues to lack knee flexion and extension and was encouraged to keep exercising but to her tolerance so she doesn't feel dizzy and do her ice. Also educated on scar tissue massage. Mally Ok was adjusted during session. Patient will continue to benefit from skilled PT services to modify and progress therapeutic interventions, address functional mobility deficits, address ROM deficits, address strength deficits, analyze and address soft tissue restrictions, analyze and cue movement patterns, analyze and modify body mechanics/ergonomics, and assess and modify postural abnormalities to attain remaining goals.       GOALS/Progress towards goals:      Patient Goal (s): To be able to walk without assistance and pain and be able to drive again    [] Met [] Not met [] Partially met  Short Term Goals: To be accomplished in 8 treatments. Patient will be independent with her HEP to progress with POC. [] Met [] Not met [] Partially met  Patient pain level will subside to <=4/10 with activity. [] Met [] Not met [] Partially met  Patient knee AROM will improve by 10 -15 deg to improve sit to stand transfers. [] Met [] Not met [] Partially met  Patient will be able to perform a SLR with decreased knee pain and lag. [] Met [] Not met [] Partially met     Long Term Goals: To be accomplished in 18-24  treatments. 1. Pt will have improved AMPAC score by 5%. [] Met [] Not met [] Partially met     2. Patient knee AROM will be >= 112 deg to equal her left LE . [] Met [] Not met [] Partially met     3. Patient strength will improve by 1/2 to 1 grade to improve stability . [] Met [] Not met [] Partially met     4. Patient will be able to perform a SLS on R LE 5-10 sec to improve balance and joint stability. [] Met [] Not met [] Partially met     5. Patient will be able to negotiate >4 (6'') steps with 1 rail, no device and STS/SOS ascending/descending. [] Met [] Not met [] Partially met     6. Patient can ambulate community distances with cane or no  assistive device and without knee pain or instability. [] Met [] Not met [] Partially met     4) Patient can get up after sitting for >/= 30 minutes without difficulty or stumbling. [] Met [] Not met [] Partially met     7) Pt will be able to squat to retrieve item from ground without increase of pain. [] Met [] Not met [] Partially met     8) Pt will have decreased swelling by 1/2 cm to improve flexibility and decrease stiffness.       [] Met [] Not met [] Partially met      PLAN  [x]  Continue plan of care  []  Upgrade activities as tolerated       []  Update interventions per flow sheet       []  Discharge due to:  []  Other:     Waleska Vieyra, PT, DPT 8/10/2022

## 2022-08-12 ENCOUNTER — HOSPITAL ENCOUNTER (OUTPATIENT)
Dept: PHYSICAL THERAPY | Age: 62
Discharge: HOME | End: 2022-08-12
Payer: COMMERCIAL

## 2022-08-12 PROCEDURE — 97140 MANUAL THERAPY 1/> REGIONS: CPT

## 2022-08-12 PROCEDURE — 97110 THERAPEUTIC EXERCISES: CPT

## 2022-08-12 NOTE — PROGRESS NOTES
PT DAILY TREATMENT NOTE     Patient Name: Mercedes Crigler  Date:2022  : 1960  [x]  Patient  Verified  Payor: Alfredo Carl / Plan: [address] / Product Type: DEREK /    Treatment Area: Pain in right knee [M25.561]   Next MD APPT: in a year  In time: 0104pm Out time: 0154pm  Total Treatment Time (min): 50  Total Timed Codes (min): 40  1:1 Treatment Time ( only): 40   Visit #: 3    SUBJECTIVE    Any medication changes, allergies to medications, adverse drug reactions, diagnosis change, or new procedure performed?:   [x] No    [] Yes (see summary sheet for update)    Pain Level (0-10 scale) pre treatment: 0/10                    Pain Level (0-10 scale) post treatment: 3/10    Subjective functional status/changes:   [] No changes reported  Pt comes in without her cane today and no pain. She states she is doing the exercises at home. OBJECTIVE    Modality rationale: decrease edema, decrease inflammation, decrease pain, increase tissue extensibility, and increase muscle contraction/control to improve the patients ability to ambulate with decreased pain.    Min Type Additional Details    [] Estim: []UnAtt   []Att       []TENS instruct                  []IFC  []Premod   []NMES                     []Other:  []w/US   []w/ice   []w/heat  Position:  Location:   10 [x]  Ice     []  Heat  []  Ice massage Position:seated  Location: R knee    []  Traction: [] Cervical       []Lumbar                       [] Prone          []Supine                       []Intermittent   []Continuous Lbs:  []w/heat  []W/heat and Estim    []  Ultrasound: []Continuous   [] Pulsed at:                           []1MHz   []3MHz Location:  W/cm2:      [x] Skin assessment post-treatment:   [x]intact  []redness- no adverse reaction   []redness - adverse reaction:     30 min Therapeutic Exercise:  [x] See flow sheet :   Rationale: increase ROM, increase strength, improve coordination, improve balance, and increase proprioception to improve the patients ability to ambulate with less pain. 10 min Manual Therapy: effleurage massage and gentle Scar tissue massage    Rationale: decrease pain, increase ROM, increase tissue extensibility, decrease edema , and decrease trigger points to improve the patients ability to ambulate with less pain. With   [x] TE   [] TA   [] Neuro   [] SC   [] other: Patient Education: [x] Review HEP    [] Progressed/Changed HEP based on:   [] positioning   [] body mechanics   [] transfers   [] Use of heat/ice    [x] other: scar mobs and desensitization         Other Objective/Functional Measures:   L knee flexion AROM 100 deg   L knee extension -5 deg      ASSESSMENT/Changes in Function:   Pt had improvement in knee extension and flexion AROM since last session. She continues to be very TTP over the anterior knee and scar, as well as patellar mobs. Advised to try scar mobs and desensitization techniques at home. The pt had more pain post-icing today. Patient will continue to benefit from skilled PT services to modify and progress therapeutic interventions, address functional mobility deficits, address ROM deficits, address strength deficits, analyze and address soft tissue restrictions, analyze and cue movement patterns, analyze and modify body mechanics/ergonomics, and assess and modify postural abnormalities to attain remaining goals. GOALS/Progress towards goals:  Patient Goal (s): To be able to walk without assistance and pain and be able to drive again    [] Met [] Not met [] Partially met  Short Term Goals: To be accomplished in 8 treatments. Patient will be independent with her HEP to progress with POC. [x] Met [] Not met [] Partially met 8/12/22  Patient pain level will subside to <=4/10 with activity. [] Met [] Not met [] Partially met  Patient knee AROM will improve by 10 -15 deg to improve sit to stand transfers.  [] Met [] Not met [] Partially met  Patient will be able to perform a SLR with decreased knee pain and lag. [] Met [] Not met [] Partially met  Long Term Goals: To be accomplished in 18-24  treatments. 1. Pt will have improved AMPAC score by 5%. [] Met [] Not met [] Partially met     2. Patient knee AROM will be >= 112 deg to equal her left LE . [] Met [] Not met [] Partially met     3. Patient strength will improve by 1/2 to 1 grade to improve stability . [] Met [] Not met [] Partially met     4. Patient will be able to perform a SLS on R LE 5-10 sec to improve balance and joint stability. [] Met [] Not met [] Partially met     5. Patient will be able to negotiate >4 (6'') steps with 1 rail, no device and STS/SOS ascending/descending. [] Met [] Not met [] Partially met     6. Patient can ambulate community distances with cane or no  assistive device and without knee pain or instability. [] Met [] Not met [] Partially met     4) Patient can get up after sitting for >/= 30 minutes without difficulty or stumbling. [] Met [] Not met [] Partially met     7) Pt will be able to squat to retrieve item from ground without increase of pain. [] Met [] Not met [] Partially met     8) Pt will have decreased swelling by 1/2 cm to improve flexibility and decrease stiffness.       [] Met [] Not met [] Partially met      PLAN  [x]  Continue plan of care  []  Upgrade activities as tolerated       []  Update interventions per flow sheet       []  Discharge due to:  []  Other:     Michael Morrow, PT, DPT 8/12/2022

## 2022-08-16 ENCOUNTER — HOSPITAL ENCOUNTER (OUTPATIENT)
Dept: PHYSICAL THERAPY | Age: 62
Discharge: HOME | End: 2022-08-16
Payer: COMMERCIAL

## 2022-08-16 PROCEDURE — 97110 THERAPEUTIC EXERCISES: CPT

## 2022-08-16 PROCEDURE — 97140 MANUAL THERAPY 1/> REGIONS: CPT

## 2022-08-16 NOTE — PROGRESS NOTES
PT DAILY TREATMENT NOTE     Patient Name: Cris Diop  AOEN:  : 1960  [x]  Patient  Verified  Payor: Zeenat Cartre / Plan: [address] / Product Type: DERKE /    Treatment Area: Pain in right knee [M25.561]   Next MD APPT: in a year  In time: 2:05 pm Out time: 3:08pm  Total Treatment Time (min): 60  Total Timed Codes (min): 50  1:1 Treatment Time ([facility] only): 50   Visit #:4    SUBJECTIVE    Any medication changes, allergies to medications, adverse drug reactions, diagnosis change, or new procedure performed?:   [x] No    [] Yes (see summary sheet for update)    Pain Level (0-10 scale) pre treatment: 2/10                    Pain Level (0-10 scale) post treatment: 3/10    Subjective functional status/changes:   [] No changes reported  Reports slight increased pain today, stated the bike hurt her last session. She has been walking without the cane but stated she has increased stiffness. OBJECTIVE    Modality rationale: decrease edema, decrease inflammation, decrease pain, increase tissue extensibility, and increase muscle contraction/control to improve the patients ability to ambulate with decreased pain.    Min Type Additional Details    [] Estim: []UnAtt   []Att       []TENS instruct                  []IFC  []Premod   []NMES                     []Other:  []w/US   []w/ice   []w/heat  Position:  Location:   10 [x]  Ice     []  Heat  []  Ice massage Position:seated  Location: R knee    []  Traction: [] Cervical       []Lumbar                       [] Prone          []Supine                       []Intermittent   []Continuous Lbs:  []w/heat  []W/heat and Estim    []  Ultrasound: []Continuous   [] Pulsed at:                           []1MHz   []3MHz Location:  W/cm2:      [x] Skin assessment post-treatment:   [x]intact  []redness- no adverse reaction   []redness - adverse reaction:     40 min Therapeutic Exercise:  [x] See flow sheet :   Rationale: increase ROM, increase strength, improve coordination, improve balance, and increase proprioception to improve the patients ability to ambulate with less pain. 10 min Manual Therapy: effleurage massage and gentle Scar tissue massage    Rationale: decrease pain, increase ROM, increase tissue extensibility, decrease edema , and decrease trigger points to improve the patients ability to ambulate with less pain. With   [x] TE   [] TA   [] Neuro   [] SC   [] other: Patient Education: [x] Review HEP    [] Progressed/Changed HEP based on:   [] positioning   [] body mechanics   [] transfers   [] Use of heat/ice    [x] other: scar mobs and desensitization         Other Objective/Functional Measures: We continue with exercises program progressing with POC. ASSESSMENT/Changes in Function:   Patient continues to progress  with exercises she tolerated new activities but presents with weakness and tightness. She reports more tenderness on medical hamstring and is very TTP around incision area. Advised to do more scar tissue massage at home and ice, she has an hematoma on the top of the knee and increased scar tissue formation. We ended session with ice patient reports feeling sore and stiff at end of session. Patient will continue to benefit from skilled PT services to modify and progress therapeutic interventions, address functional mobility deficits, address ROM deficits, address strength deficits, analyze and address soft tissue restrictions, analyze and cue movement patterns, analyze and modify body mechanics/ergonomics, and assess and modify postural abnormalities to attain remaining goals. GOALS/Progress towards goals:  Patient Goal (s): To be able to walk without assistance and pain and be able to drive again    [] Met [] Not met [] Partially met  Short Term Goals: To be accomplished in 8 treatments. Patient will be independent with her HEP to progress with POC.  [x] Met [] Not met [] Partially met 8/12/22  Patient pain level will subside to <=4/10 with activity. [] Met [] Not met [] Partially met  Patient knee AROM will improve by 10 -15 deg to improve sit to stand transfers. [] Met [] Not met [] Partially met  Patient will be able to perform a SLR with decreased knee pain and lag. [] Met [] Not met [] Partially met  Long Term Goals: To be accomplished in 18-24  treatments. 1. Pt will have improved AMPAC score by 5%. [] Met [] Not met [] Partially met     2. Patient knee AROM will be >= 112 deg to equal her left LE . [] Met [] Not met [] Partially met     3. Patient strength will improve by 1/2 to 1 grade to improve stability . [] Met [] Not met [] Partially met     4. Patient will be able to perform a SLS on R LE 5-10 sec to improve balance and joint stability. [] Met [] Not met [] Partially met     5. Patient will be able to negotiate >4 (6'') steps with 1 rail, no device and STS/SOS ascending/descending. [] Met [] Not met [] Partially met     6. Patient can ambulate community distances with cane or no  assistive device and without knee pain or instability. [] Met [] Not met [] Partially met     4) Patient can get up after sitting for >/= 30 minutes without difficulty or stumbling. [] Met [] Not met [] Partially met     7) Pt will be able to squat to retrieve item from ground without increase of pain. [] Met [] Not met [] Partially met     8) Pt will have decreased swelling by 1/2 cm to improve flexibility and decrease stiffness.       [] Met [] Not met [] Partially met      PLAN  [x]  Continue plan of care  []  Upgrade activities as tolerated       []  Update interventions per flow sheet       []  Discharge due to:  []  Other:     Murray Tony, PT, DPT 8/16/2022

## 2022-08-18 ENCOUNTER — APPOINTMENT (OUTPATIENT)
Dept: PHYSICAL THERAPY | Age: 62
End: 2022-08-18
Payer: COMMERCIAL

## 2022-08-23 ENCOUNTER — APPOINTMENT (OUTPATIENT)
Dept: PHYSICAL THERAPY | Age: 62
End: 2022-08-23
Payer: COMMERCIAL

## 2022-08-25 ENCOUNTER — APPOINTMENT (OUTPATIENT)
Dept: PHYSICAL THERAPY | Age: 62
End: 2022-08-25
Payer: COMMERCIAL

## 2022-08-30 ENCOUNTER — APPOINTMENT (OUTPATIENT)
Dept: PHYSICAL THERAPY | Age: 62
End: 2022-08-30
Payer: COMMERCIAL

## 2022-09-01 ENCOUNTER — APPOINTMENT (OUTPATIENT)
Dept: PHYSICAL THERAPY | Age: 62
End: 2022-09-01

## 2022-11-21 NOTE — PROGRESS NOTES
[address] [facility] [address] [place] [address]  Ph: [phone]  Fax: [phone]    Discharge Summary 2-15    Patient name: Maryuri Flores  : 1960  Provider#: [id number]  Referral source: Reid Meckel, MD      Medical/Treatment Diagnosis: Pain in right knee [M25.561]     Prior Hospitalization: see medical history     Comorbidities: See Plan of Care  Prior Level of Function: See Plan of Care  Medications: Verified on Patient Summary List  Start of Care: 22   Onset Date: (see initial plan of care)  Visits from Start of Care: 4  Missed Visits: 0  Certification Period :  22-22    Assessment/Summary of care/GOALS:   Patient did not return for follow up visits or respond to our calls. The last treatment was on 22, and so the patient will be discharged at this time. Goals were not re-assessed. Thank you for the referral.      GOALS/Progress towards goals:  Patient Goal (s): To be able to walk without assistance and pain and be able to drive again    [] Met [] Not met [] Partially met  Short Term Goals: To be accomplished in 8 treatments. Patient will be independent with her HEP to progress with POC. [x] Met [] Not met [] Partially met 22  Patient pain level will subside to <=4/10 with activity. [] Met [] Not met [] Partially met  Patient knee AROM will improve by 10 -15 deg to improve sit to stand transfers. [] Met [] Not met [] Partially met  Patient will be able to perform a SLR with decreased knee pain and lag. [] Met [] Not met [] Partially met  Long Term Goals: To be accomplished in 18-24  treatments. 1. Pt will have improved AMPAC score by 5%. [] Met [] Not met [] Partially met     2. Patient knee AROM will be >= 112 deg to equal her left LE . [] Met [] Not met [] Partially met     3. Patient strength will improve by 1/2 to 1 grade to improve stability . [] Met [] Not met [] Partially met     4.  Patient will be able to perform a SLS on R LE 5-10 sec to improve balance and joint stability. [] Met [] Not met [] Partially met     5. Patient will be able to negotiate >4 (6'') steps with 1 rail, no device and STS/SOS ascending/descending. [] Met [] Not met [] Partially met     6. Patient can ambulate community distances with cane or no  assistive device and without knee pain or instability. [] Met [] Not met [] Partially met     4) Patient can get up after sitting for >/= 30 minutes without difficulty or stumbling. [] Met [] Not met [] Partially met     7) Pt will be able to squat to retrieve item from ground without increase of pain. [] Met [] Not met [] Partially met     8) Pt will have decreased swelling by 1/2 cm to improve flexibility and decrease stiffness.       [] Met [] Not met [] Partially met         RECOMMENDATIONS:  [x]Discontinue therapy:   []Patient has reached or is progressing toward set goals and is independent with HEP   [x]Patient is non-compliant or has abdicated   []Due to lack of appreciable progress towards set goals   []Patient was hospitalized or suffered illness that impacted ability to continue therapy   []Other:   Sandra Barragan, PT, DPT 11/21/2022

## 2022-11-22 ENCOUNTER — OFFICE VISIT (OUTPATIENT)
Dept: ORTHOPEDIC SURGERY | Age: 62
End: 2022-11-22
Payer: COMMERCIAL

## 2022-11-22 DIAGNOSIS — M17.12 OSTEOARTHRITIS OF LEFT KNEE, UNSPECIFIED OSTEOARTHRITIS TYPE: Primary | ICD-10-CM

## 2022-11-22 DIAGNOSIS — M25.562 LEFT KNEE PAIN, UNSPECIFIED CHRONICITY: ICD-10-CM

## 2022-11-22 PROCEDURE — 99214 OFFICE O/P EST MOD 30 MIN: CPT | Performed by: ORTHOPAEDIC SURGERY

## 2022-11-22 NOTE — PROGRESS NOTES
Name: Carlos Manuel Jim    : 1960     Service Dept: [address] [place] [address] and Sports Medicine    Chief Complaint   Patient presents with    Knee Pain        There were no vitals taken for this visit. Allergies   Allergen Reactions    Lyrica [Pregabalin] Anaphylaxis    Codeine Other (comments)    Metoclopramide Other (comments)     Facial Spasms  Other reaction(s): could not open eyes    Oxycodone Hcl Other (comments)     Pt states she isn't allergic to this. Propoxyphene N-Acetaminophen Nausea and Vomiting     Other reaction(s): N & V        Current Outpatient Medications   Medication Sig Dispense Refill    HYDROcodone-acetaminophen (NORCO) 5-325 mg per tablet TAKE 1 TABLET BY MOUTH EVERY 12 HOURS AS NEEDED FOR 14 DAYS      ondansetron (ZOFRAN ODT) 4 mg disintegrating tablet Take 1 Tablet by mouth every eight (8) hours as needed for Nausea, Vomiting or Nausea or Vomiting for up to 20 doses. 20 Tablet 0    metoprolol succinate (TOPROL-XL) 50 mg XL tablet Take 50 mg by mouth in the morning. cyclobenzaprine (FLEXERIL) 10 mg tablet       amLODIPine (NORVASC) 10 mg tablet Take  by mouth daily. acetaminophen (TYLENOL) 650 mg CR tablet Take 650 mg by mouth every six (6) hours as needed for Pain.         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    Arthritis of both knees M17.0    Adjustment disorder with anxious mood F43.22    Impaired fasting glucose R73.01    Hot flashes due to menopause N95.1    Graves disease E05.00    Primary insomnia F51.01    Polycystic ovaries E28.2    Morbid obesity (HCC) E66.01    Iron deficiency anemia due to chronic blood loss D50.0    Hyperthyroidism E05.90    Sarcoidosis D86.9    Rheumatoid arthritis (HCC) M06.9    OA (osteoarthritis) of knee M17.9      Family History   Problem Relation Age of Onset    Diabetes Father     Hypertension Father     Stroke Father     Hypertension Mother       Social History     Socioeconomic History    Marital status:    Tobacco Use    Smoking status: Never    Smokeless tobacco: Never   Vaping Use    Vaping Use: Never used   Substance and Sexual Activity    Alcohol use: Not Currently     Alcohol/week: 0.0 standard drinks    Drug use: Never    Sexual activity: Not Currently      Past Surgical History:   Procedure Laterality Date    HX CHOLECYSTECTOMY  1984    HX HYSTERECTOMY  2001      Past Medical History:   Diagnosis Date    Anemia     Arthritis     HTN (hypertension)     PCOS (polycystic ovarian syndrome)     Sarcoidosis 1981    Thyroid condition         I have reviewed and agree with [facility] and ROS and intake form in chart and the record furthermore I have reviewed prior medical record(s) regarding this patients care during this appointment. Review of Systems:   Patient is a pleasant appearing individual, appropriately dressed, well hydrated, well nourished, who is alert, appropriately oriented for age, and in no acute distress with a normal gait and normal affect who does not appear to be in any significant pain. Physical Exam:  Left Knee -Decrease range of motion with flexion, Knee arc of greater than 50 degrees, Some crepitation, Grossly neurovascularly intact, Good cap refill, No skin lesion, Moderate swelling, some gross instability, Some quadriceps weakness, Kellgren and Denzel at least grade 3    Right Knee - Full Range of Motion, No crepitation, Grossly neurovascularly intact, Good cap refill, No skin lesion, No swelling, No gross instability, No quadriceps weakness    Encounter Diagnoses     ICD-10-CM ICD-9-CM   1. Osteoarthritis of left knee, unspecified osteoarthritis type  M17.12 715.96   2. Left knee pain, unspecified chronicity  M25.562 719.46       HPI:  The patient is here with a chief complaint of left knee pain, dull, throbbing pain. Pain is 4/10.     X-rays are positive for severe OA.    Assessment/Plan:  Plan will be for left total knee replacement. We will match it up to the opposite side. General medical clearance. We will not use a high BMI unless absolutely needed and she is top heavy so it should be okay. Using old clearances at [facility] [place] [facility], we will try to get it done as soon as possible using old clearance. As part of continued conservative pain management options the patient was advised to utilize Tylenol or OTC NSAIDS as long as it is not medically contraindicated. Return to Office: Follow-up and Dispositions    Return for schedule for surgery. Scribed by Benigno Hernandez LPN as dictated by [facility] SARAH BETH Pagan MD.  Documentation True and Accepted Michael Pagan MD

## 2022-11-22 NOTE — PATIENT INSTRUCTIONS

## 2022-11-22 NOTE — LETTER
11/25/2022    Patient: Carmita Zimmerman   YOB: 1960   Date of Visit: 11/22/2022     Miryam Knowles MD  [facility] [address]  Via Fax: [phone]    Dear Miryam Knowles MD,      Thank you for referring Ms. Marina Al to [address] [place] [address] AND SPORTS MEDICINE for evaluation. My notes for this consultation are attached. If you have questions, please do not hesitate to call me. I look forward to following your patient along with you.       Sincerely,    Michael Reyes MD

## 2023-03-01 DIAGNOSIS — Z96.652 STATUS POST TOTAL LEFT KNEE REPLACEMENT: Primary | ICD-10-CM

## 2023-03-14 ENCOUNTER — OFFICE VISIT (OUTPATIENT)
Age: 63
End: 2023-03-14
Payer: COMMERCIAL

## 2023-03-14 ENCOUNTER — HOSPITAL ENCOUNTER (OUTPATIENT)
Age: 63
Discharge: HOME | End: 2023-03-17

## 2023-03-14 DIAGNOSIS — M17.12 PRIMARY OSTEOARTHRITIS OF LEFT KNEE: Primary | ICD-10-CM

## 2023-03-14 DIAGNOSIS — M17.11 UNILATERAL PRIMARY OSTEOARTHRITIS, RIGHT KNEE: ICD-10-CM

## 2023-03-14 DIAGNOSIS — M17.12 UNILATERAL PRIMARY OSTEOARTHRITIS, LEFT KNEE: Primary | ICD-10-CM

## 2023-03-14 DIAGNOSIS — M17.12 UNILATERAL PRIMARY OSTEOARTHRITIS, LEFT KNEE: ICD-10-CM

## 2023-03-14 PROCEDURE — 99213 OFFICE O/P EST LOW 20 MIN: CPT | Performed by: NURSE PRACTITIONER

## 2023-03-14 RX ORDER — LOSARTAN POTASSIUM AND HYDROCHLOROTHIAZIDE 12.5; 5 MG/1; MG/1
TABLET ORAL
COMMUNITY
Start: 2023-02-20

## 2023-03-14 RX ORDER — LANOLIN ALCOHOL/MO/W.PET/CERES
CREAM (GRAM) TOPICAL
COMMUNITY

## 2023-03-14 RX ORDER — SODIUM BICARBONATE 650 MG/1
TABLET ORAL
COMMUNITY
Start: 2023-01-23

## 2023-03-14 RX ORDER — OXYCODONE HYDROCHLORIDE AND ACETAMINOPHEN 5; 325 MG/1; MG/1
1 TABLET ORAL
Qty: 30 TABLET | Refills: 0 | Status: SHIPPED | OUTPATIENT
Start: 2023-03-14 | End: 2023-03-22

## 2023-03-14 RX ORDER — IBUPROFEN 200 MG
TABLET ORAL
COMMUNITY
Start: 2021-08-03

## 2023-03-14 RX ORDER — ALBUTEROL SULFATE 90 UG/1
AEROSOL, METERED RESPIRATORY (INHALATION) 4 TIMES DAILY
COMMUNITY

## 2023-03-14 RX ORDER — NAPROXEN SODIUM 220 MG
TABLET ORAL 2 TIMES DAILY
COMMUNITY

## 2023-03-14 RX ORDER — CEPHALEXIN 500 MG/1
500 CAPSULE ORAL EVERY 8 HOURS
Qty: 9 CAPSULE | Refills: 0 | Status: SHIPPED | OUTPATIENT
Start: 2023-03-14 | End: 2023-03-17

## 2023-03-14 RX ORDER — ONDANSETRON 8 MG/1
8 TABLET, ORALLY DISINTEGRATING ORAL EVERY 8 HOURS PRN
Qty: 20 TABLET | Refills: 0 | Status: SHIPPED | OUTPATIENT
Start: 2023-03-14 | End: 2023-03-21

## 2023-03-14 RX ORDER — CARVEDILOL 12.5 MG/1
TABLET ORAL 2 TIMES DAILY
COMMUNITY

## 2023-03-14 NOTE — PROGRESS NOTES
Subjective:     Liu Cedeno is a 60 y.o. female who presents today for surgical preop visit. Past Medical History:   Diagnosis Date    Anemia     Arthritis     HTN (hypertension)     PCOS (polycystic ovarian syndrome)     Sarcoidosis 1981    Thyroid condition        Past Surgical History:   Procedure Laterality Date    CHOLECYSTECTOMY  1984    HYSTERECTOMY (CERVIX STATUS UNKNOWN)  2001         Current Outpatient Medications   Medication Sig Dispense Refill    ibuprofen (ADVIL;MOTRIN) 200 MG tablet 0 Refills      cephALEXin (KEFLEX) 500 MG capsule Take 1 capsule by mouth every 8 (eight) hours for 3 days Do not start medication until after surgery 9 capsule 0    oxyCODONE-acetaminophen (PERCOCET) 5-325 MG per tablet Take 1 tablet by mouth every 4-6 hours as needed for Pain for up to 8 days. Do not start taking pain medication until after surgery!  Max Daily Amount: 6 tablets 30 tablet 0    ondansetron (ZOFRAN-ODT) 8 MG TBDP disintegrating tablet Place 1 tablet under the tongue every 8 hours as needed for Nausea or Vomiting Do Not start until after surgery 20 tablet 0    albuterol sulfate HFA (PROVENTIL;VENTOLIN;PROAIR) 108 (90 Base) MCG/ACT inhaler 4 times daily      carvedilol (COREG) 12.5 MG tablet 2 times daily      ferrous sulfate (FE TABS 325) 325 (65 Fe) MG EC tablet Take by mouth      losartan-hydroCHLOROthiazide (HYZAAR) 50-12.5 MG per tablet       naproxen sodium (ANAPROX) 220 MG tablet 2 times daily      sodium bicarbonate 650 MG tablet       acetaminophen (TYLENOL) 650 MG extended release tablet Take 650 mg by mouth every 6 hours as needed      amLODIPine (NORVASC) 10 MG tablet Take by mouth daily      cyclobenzaprine (FLEXERIL) 10 MG tablet ceived the following from [facility]: Outside name: cyclobenzaprine (FLEXERIL) 10 mg tablet      HYDROcodone-acetaminophen (NORCO) 5-325 MG per tablet TAKE 1 TABLET BY MOUTH EVERY 12 HOURS AS NEEDED FOR 14 DAYS      metoprolol succinate (TOPROL XL) 50 MG extended release tablet Take 50 mg by mouth daily      ondansetron (ZOFRAN-ODT) 4 MG disintegrating tablet Take 4 mg by mouth every 8 hours as needed       No current facility-administered medications for this visit. Allergies   Allergen Reactions    Pregabalin Anaphylaxis    Metoclopramide Other (See Comments)     Facial Spasms  Other reaction(s): could not open eyes       ROS:  Patient is a pleasant appearing individual, appropriately dressed, well hydrated, well nourished, who is alert, appropriately oriented for age, and in no acute distress with a normal gait and normal affect who does not appear to be in any significant pain. Please note that this patient will be scheduled for surgery and that Dr. Layla Aguirre is requesting a pre-op medical clearance for GENERAL Anesthesia. Our office will schedule the appt for the medical clearance with the medical provider. After evaluating the patient, please fax all labs, EKGs, diagnostic studies, and a risk assessment / clearance note (that is legible or typed ) indicating if the patient is medically cleared to Dr. Layla Aguirre (Attn: Surgery Scheduling) ASAP to [phone]. If the patient is not or will not be medically cleared prior to the surgery date please notify Dr. Foreman Class office. Thank you for assisting me in this patient's care. The patient was counseled about the risks of trina Covid-19 during their perioperative period and any recovery window from their procedure. The patient was made aware that trina Covid-19 may worsen their prognosis for recovering from their procedure and lend to a higher morbidity and/or mortality risk. All material risks, benefits, and reasonable alternatives including postponing the procedure were discussed. The patient DOES wish to proceed with their procedure at this time.         Physical Exam:    Right knee - Neurovascularly intact with good cap refill, full range of motion and full strength, well healed incision noted, no swelling, no erythema, no instability. Left knee - Decrease range of motion with flexion, Some crepitation, Grossly neurovascularly intact, Good cap refill, No skin lesion, Moderate swelling, No gross instability, Some quadriceps weakness       Assessment:   Diagnosis Orders   1. Primary osteoarthritis of left knee  oxyCODONE-acetaminophen (PERCOCET) 5-325 MG per tablet          No orders of the defined types were placed in this encounter. Plan:  The patient will need a lefttotal knee arthroplasty to be performed at [facility] by Dr. Rose Ansari on an inpatient basis. The risk benefits complications and alternatives of the procedure been reviewed with the patient who voices understanding. The patient, Dr. Rose Ansari, and I agree that the potential risks of surgery are far outweigh by the potential benefits and we will therefore proceed as discussed. Patient's postoperative medications have been sent to their pharmacy.       JUDE Chris

## 2023-03-15 LAB
BUN SERPL-MCNC: 21 MG/DL (ref 8–27)
BUN/CREAT SERPL: 14 (ref 12–28)
CALCIUM SERPL-MCNC: 10.2 MG/DL (ref 8.7–10.3)
CHLORIDE SERPL-SCNC: 103 MMOL/L (ref 96–106)
CO2 SERPL-SCNC: 20 MMOL/L (ref 20–29)
CREAT SERPL-MCNC: 1.54 MG/DL (ref 0.57–1)
EGFRCR SERPLBLD CKD-EPI 2021: 38 ML/MIN/1.73
ERYTHROCYTE [DISTWIDTH] IN BLOOD BY AUTOMATED COUNT: 20.3 % (ref 11.7–15.4)
GLUCOSE SERPL-MCNC: 96 MG/DL (ref 70–99)
HCT VFR BLD AUTO: 33 % (ref 34–46.6)
HGB BLD-MCNC: 9.9 G/DL (ref 11.1–15.9)
MCH RBC QN AUTO: 19.2 PG (ref 26.6–33)
MCHC RBC AUTO-ENTMCNC: 30 G/DL (ref 31.5–35.7)
MCV RBC AUTO: 64 FL (ref 79–97)
PLATELET # BLD AUTO: 465 X10E3/UL (ref 150–450)
POTASSIUM SERPL-SCNC: 4.7 MMOL/L (ref 3.5–5.2)
RBC # BLD AUTO: 5.16 X10E6/UL (ref 3.77–5.28)
SODIUM SERPL-SCNC: 138 MMOL/L (ref 134–144)
SPECIMEN STATUS REPORT: NORMAL
WBC # BLD AUTO: 13.3 X10E3/UL (ref 3.4–10.8)

## 2023-03-16 LAB — MRSA SPEC QL CULT: NEGATIVE

## 2023-03-22 ENCOUNTER — HOSPITAL ENCOUNTER (OUTPATIENT)
Dept: PHYSICAL THERAPY | Age: 63
Discharge: HOME | End: 2023-03-22
Payer: COMMERCIAL

## 2023-03-22 PROCEDURE — 97110 THERAPEUTIC EXERCISES: CPT

## 2023-03-22 PROCEDURE — 97162 PT EVAL MOD COMPLEX 30 MIN: CPT

## 2023-03-22 NOTE — THERAPY EVALUATION
[address] [address] [facility] [address] Suite [facility], [facility] ([facility])  Phone: [phone]    Fax: [phone]    Initial Evaluation/Plan of Care/Statement of Necessity for Physical Therapy Services     Patient name: Any Phelan    Date/Start of Care:3/22/2023  : 1960  [x]  Patient  Verified  Provider#: [id number]          Referral source: Sol Chawla MD    Return visit to MD: 3/28/23     Medical/Treatment Diagnosis: Left knee pain [M25.562]  Presence of left artificial knee joint [Z96.652]    Payor: BLUE CROSS / Plan: Jacqueline 97 / Product Type: DEREK /       Prior Hospitalization: see medical history          Medications: Verified on Patient Summary List  Substance use: See intake    Comorbidities: see intake   Prior Level of Function: Ambulating with a cane; difficulty with stairs and bending over to  objects from floor and getting up from a chair. 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 Motivation: good  Cognition: A & O x 4     SUBJECTIVE:  Onset Date: 3/21/23    Mechanism of Injury/History of Complaint: H.o. L knee OA which progressed causing a lot of pain and difficulty with functional tasks. S/p Pt states she is icing off and on all day. Pt is also wearing the calf compressions when lying or sitting. Pt is using a walker.     Area of pain:  Anterior L knee    Pain Descriptors:  aching pain, sharp occasionally    Pain Level (0-10 scale)  At rest: 2-10 With activity: 2-10   Worst: 10   Least: 2-3  Things that worsen pain: as the day progresses or medication wears off   Things that ease pain: percocets every 4 hours     OBJECTIVE/FUNCTIONAL MEASURES including ROM/MMT:   Physical Findings   Ortho:   Posture:  flexed posture in standing with slight WS to R   Gait and Functional Mobility:  ambulating with RW - uneven step length, ervin flat through stance phase. WBS:  WBAT   Palpation: TTP throughout the L knee - no palpable tenderness along the calf   Swelling: + swelling in L knee - will take measurements once bandage is removed   Gross findings:  L knee bandage intact with no signs of drainage, some bruising along the medial aspect of L knee     Specific joints: *normal values in ()  KNEE        AROM          PROM                       MMT   R L R L R L   Extension (0)  (-)2  (-)15 P! 4+ 3-   Flexion (145) 109 48 P!     4+ 3-   Patellar Mobility:  NT  Additional comments: unable to fully extend knee in seated position; unable to perform SLR     HIP     AROM       PROM             MMT   R L R L R L   Flexion (120)     4+ 3-   Extension (15)         Abduction (40)         Adduction (30)         IR (40)         ER (40)         Additional comments:     ANKLE                               AROM                     PROM                     MMT:   R L R L R L   Dorsiflexion (15)      5 5   Plantarflexion (50)     4+ 4+   Inversion (35)          Eversion (25)         Additional comments:   Mobility Assessment: assist to lift L LE onto/off bed; increase weight to the R during sit to stands with use of UEs       Neurological: Reflexes / Sensations: will assess further once bandages are removed   Special Tests: NT  TUG score: 45 seconds with RW, supervision   Stairs: step to pattern with railings and CGA       Patient/ Caregiver education and instruction: activity modification and exercises  Problem List/Impairments: pain affecting function, decrease ROM, decrease strength, edema affecting function, impaired gait/ balance, decrease ADL/ functional abilitiies, decrease activity tolerance, decrease flexibility/ joint mobility, and decrease transfer abilities    TODAY'S TREATMENT: EVALUATION completed           Evaluation Complexity (Low, Medium, High): moderate  Visit #: 1/16-24  In time:11:35am   Out time:12:35pm  Total Treatment Time (min): 60   Total Timed Codes (min): 15 1:1 Treatment Time ([facility] only): 15   Pain Level (0-10 scale) pre treatment: 7   Pain Level (0-10 scale) post treatment: 6    OBJECTIVE:  Modality rationale: decrease edema and decrease inflammation to improve the patients ability to move around without L knee pain    Min Type Additional Details        10 [x]  Ice     []  Heat  []  Ice massage Position: supine with L LE elevated   Location: L knee      [] Skin assessment post-treatment:  []intact  []redness- no adverse reaction       []redness - adverse reaction:    15 min Therapeutic Exercise:  [x] See flow sheet :   Rationale: increase ROM and increase strength to improve the patients ability to perform daily activities without L knee pain     With   [x] TE   [] TA   [] Neuro   [] SC   [] other: Patient Education:  [x] HEP reviewed          [x] Fall Prevention/Gait Training         [] Posture Correction  [] Progressed/Changed HEP based on:        [] positioning/ body mechanics  [] repetitions/resistance  [] transfers  [] pain   [x] Safe use of heat/ice  [] Scar/Soft Tissue mobilizations  [x] Other: discussed POC         OTHER OBJECTIVE/FUNCTIONAL MEASURES:   Initiated exercises in clinic and worked on heel to toe gait pattern        ASSESSMENT/Changes in Function:   Pt referred to PT services post op day 1 L TKA. Pt presents with L knee swelling/pain, decrease L knee ROM and strength, decrease hip strength, balance and gait deviations.   Pt will benefit from skilled services to improve performance with joint stabilization and mechanics, knee flexibility and strength, neuromuscular activation and awareness of the LE for joint protection, and to address impairments in order to meet functional goals. GOALS/Progress Towards Goals:    Ampac Score:  Initial: 67.25%    Patient Goal (s): no pain and walking.     [] Met [] Not met [] Partially met     Short Term Goals: To be accomplished in 8-12 treatments. Indep with a HEP to assist in rehab progression. [] Met [] Not met [] Partially met    0-90 L knee AROM to improve mobility in the L leg. [] Met [] Not met [] Partially met    Pt will be able to perform a SLR without extensor lag for improved strength. [] Met [] Not met [] Partially met    Max L knee pain 5/10 for increase activity tolerance. [] Met [] Not met [] Partially met    Pt will be able to ambulate with correct heel to toe mechanics using RW for improvement in gait. [] Met [] Not met [] Partially met     Long Term Goals: To be accomplished in 16-24 treatments. 1) Pt will have improved AMPAC score by 5%. [] Met [] Not met [] Partially met   2) Pt will have ROM/MMT WFL to improve joint function and stability without pain. [] Met [] Not met [] Partially met   3) Patient can ambulate community distances with LRAD and without knee pain or instability. [] Met [] Not met [] Partially met   4) Patient can get up after sitting for >/= 30 minutes without difficulty or stumbling. [] Met [] Not met [] Partially met   5) Patient can negotiate uneven terrain with LRAD without knee pain or instability. [] Met [] Not met [] Partially met     6) Patient can go up and down steps reciprocally with railing without difficulty or fatigue. [] Met [] Not met [] Partially met   7) Pt will be able to squat to retrieve item from ground without increase of pain. [] Met [] Not met [] Partially met   8) Pt will have decreased swelling by 1/2 cm to improve flexibility and decrease stiffness.  [] Met [] Not met [] Partially met    9) TUG score <20 seconds with LRAD to reduce risk for falls. [] Met [] Not met [] Partially met      PLAN:  Frequency / Duration: Patient to be seen 2-3 times per week for 16-24 treatments. Certification Period: (Initial) 3/22/23 - 6/23/23  Treatment Plan may include any combination of the following: Therapeutic exercise, Neuromuscular reeducation, Manual therapy, Therapeutic activity, Self care/home management, Electric stim unattended , and Gait training    [x]  Continue plan of care  [x]  Upgrade activities as tolerated       [x]  Update interventions per flow sheet     [x]  Other:    get girth measurements at knee joint   []  Discharge due to:      [x]  Plan of care has been reviewed with PTA. The Plan of Care is based on information from the initial evaluation. Hellen Tomlinson, PT, DPT 3/22/2023   ________________________________________________________________________    I certify that the above Therapy Services are being furnished while the patient is under my care. I agree with the treatment plan and certify that this therapy is necessary.     Physician's Signature:_________________________________________________  Date:____________Time: ____________     Cuca Landaverde MD

## 2023-03-24 ENCOUNTER — APPOINTMENT (OUTPATIENT)
Dept: PHYSICAL THERAPY | Age: 63
End: 2023-03-24
Payer: COMMERCIAL

## 2023-03-28 ENCOUNTER — TELEMEDICINE (OUTPATIENT)
Age: 63
End: 2023-03-28

## 2023-03-28 DIAGNOSIS — Z96.652 STATUS POST LEFT KNEE REPLACEMENT: Primary | ICD-10-CM

## 2023-03-28 PROCEDURE — 99024 POSTOP FOLLOW-UP VISIT: CPT | Performed by: NURSE PRACTITIONER

## 2023-03-28 RX ORDER — OXYCODONE HYDROCHLORIDE AND ACETAMINOPHEN 5; 325 MG/1; MG/1
1 TABLET ORAL EVERY 6 HOURS PRN
Qty: 30 TABLET | Refills: 0 | Status: SHIPPED | OUTPATIENT
Start: 2023-03-28 | End: 2023-04-05

## 2023-03-28 NOTE — LETTER
If you experience any significant calf pain or swelling or shortness of breath, please call Dr. Melquiades Jeronimo or go to the ER if it is urgent. Diet    Drink plenty of fluids (unless your doctor tells you not to). You may notice that your bowel movements are not regular right after your surgery. This is common. Try to avoid constipation and straining with bowel movements. Drinking enough fluids, taking a stool softener, and eating foods that are good sources of fiber can help you avoid constipation. If you have not had a bowel movement after a couple of days, talk to your doctor. Medicines    You should take aspirin 325 mg twice daily until you are 1 month out from surgery. If you take a prescription blood thinner, continue that as directed. If you think your pain medicine is making you sick to your stomach:  ? Take your medicine after meals (unless your doctor has told you not to). ? Take the prescribed nausea pills as directed. ? Ask your doctor for a different pain medicine. If your doctor prescribed antibiotics, take them as directed. If you require a refill on narcotic pain medication, please let us know at the time of today's appointment or give at least 2 business days for refill for future dates. Incision care    You can shower and get your incision wet with soap and water. Pat it dry and no further dressing changes will be required. You will see a clear surgical mesh tape on your knee. You may remove that tape two weeks after the surgery. If you notice any redness around the incision site or fluid from the knee incision, call Dr. Munir Canales office immediately. Ice    For pain and swelling, put ice or a cold pack on the area for 10 to 20 minutes at a time. Put a thin cloth between the ice and your skin. If your doctor recommended cold therapy using a portable machine, follow the instructions that came with the machine.    Other instructions    Wear compression stockings if your doctor

## 2023-03-28 NOTE — PROGRESS NOTES
Subjective:      Patient presents for postop care following left TKA. Surgery was on 3/21/2023. Ambulating  with a cane . Pain is controlled with current analgesics. Medication(s) being used: Percocet. .    Objective: There were no vitals taken for this visit. General:  alert, cooperative, no distress, appears stated age   ROM: -5/95; +SLR   Incision:   healing well, no drainage, no erythema, incision well approximated, mild swelling     Assessment:     Doing well postoperatively. Plan:     1. Continue PT. 2. Wound care/showering discussed. 3. Continue DVT prophylaxis as directed. 4. Follow up at 1 yr with Dr. Estuardo De La Rosa for xray's of the left knee and as needed. Júnior Ramos, who was evaluated through a synchronous (real-time) audio-video encounter, and/or her healthcare decision maker, is aware that it is a billable service, with coverage as determined by her insurance carrier. She provided verbal consent to proceed: Yes, and patient identification was verified. It was conducted pursuant to the emergency declaration under the [address] [place] [address], [address] [place] [address] authority and the [employer] and [employer] Act. A caregiver was present when appropriate. Ability to conduct physical exam was limited. I was in the office. The patient was at home.

## 2023-03-30 ENCOUNTER — HOSPITAL ENCOUNTER (OUTPATIENT)
Dept: PHYSICAL THERAPY | Age: 63
End: 2023-03-30
Payer: COMMERCIAL

## 2023-03-30 PROCEDURE — 97110 THERAPEUTIC EXERCISES: CPT

## 2023-03-30 NOTE — PROGRESS NOTES
PT DAILY TREATMENT NOTE     Patient Name: Eduin Curtis  Date:3/30/2023  : 1960  [x]  Patient  Verified  Payor: Jacinto Mcmanus / Plan: Jacqueline 97 / Product Type: DEREK /    Treatment Area: Left knee pain [M25.562]  Presence of left artificial knee joint [Z96.652]   Next MD APPT:     In time: 1:03   Out time: 2:09  Total Treatment Time (min): 60  Total Timed Codes (min): 45  1:1 Treatment Time ( [address] only): 39     Visit #: 2    SUBJECTIVE  Any medication changes, allergies to medications, adverse drug reactions, diagnosis change, or new procedure performed?:   [x] No    [] Yes (see summary sheet for update)    Pain Level (0-10 scale) pre treatment: 3/10        Pain Level (0-10 scale) post treatment: 3/10 and sore    Subjective functional status/changes:   [] No changes reported  Patient stated she has been doing her exercises but she has increased pain, she has been feeling dizzy sometimes when she walks stated they added a new BP med. She was encouraged to monitor her BP at home. No dizziness upon arrival and she return today with a cane, ambulated with a step to antalgic gait pattern.      OBJECTIVE    Modality rationale: decrease edema, decrease inflammation, decrease pain, increase tissue extensibility, and increase muscle contraction/control to improve the patients ability to reduce pain with ambulation and ADLs   Min Type Additional Details       [] Estim: []Att   []Unatt    []TENS instruct                  []IFC  []Premod   []NMES                     []Other:  []w/US   []w/ice   []w/heat  Position:  Location:       []  Traction: [] Cervical       []Lumbar                       [] Prone          []Supine                       []Intermittent   []Continuous Lbs:  [] before manual  [] after manual  []w/heat    []  Ultrasound: []Continuous   [] Pulsed                       at: []1MHz   []3MHz Location:  W/cm2:    [] Paraffin         Location:   []w/heat   15 [x]  Ice     []  Heat  []  Ice massage Position:L knee   Location:supine     []  Laser  []  Other: Position:  Location:      []  Vasopneumatic Device Pressure:       [] lo [] med [] hi   Temperature:      [x] Skin assessment post-treatment:  [x]intact []redness- no adverse reaction    []redness - adverse reaction:       40 min Therapeutic Exercise:  [x] See flow sheet :   Rationale: increase ROM, increase strength, improve coordination, improve balance, and increase proprioception to improve the patients ability to reduce pain with ambulation and ADL's     5 min Manual Therapy: gentle scar massage and knee cap mobilization    Rationale: decrease pain, increase ROM, increase tissue extensibility, decrease edema , correct positional vertigo, decrease trigger points, and increase postural awareness to improve the patients ability to reduce pain with ambulation/ADLs      OTHER OBJECTIVE/FUNCTIONAL MEASURES:   Initiated exercises in clinic and worked on heel to toe gait pattern      Cues to avoid knee extension upon sitting provided  We progressed with POC. L knee flexion 85 deg flexion      ASSESSMENT/Changes in Function:   Pt tolerated session well, she presents with L knee weakness, she required AA with SAQ unable to do SLR. She requires cues with sit to stand to avoid knee extension and was educated on promoting knee extension while in supine. Pt still presents with L knee brushing on medial joint line, swelling/pain, decrease L knee ROM and strength, decrease hip strength, balance and gait deviations. Pt will benefit from skilled services to improve performance with joint stabilization and mechanics, knee flexibility and strength, neuromuscular activation and awareness of the LE for joint protection, and to address impairments in order to meet functional goals. GOALS/Progress Towards Goals:     Ampac Score:  Initial: 67.25%     Patient Goal (s): no pain and walking.     [] Met [] Not met [] Partially met      Short Term Goals:  To be accomplished in 8-12 treatments. Indep with a HEP to assist in rehab progression. [] Met [] Not met [] Partially met    0-90 L knee AROM to improve mobility in the L leg. [] Met [] Not met [] Partially met    Pt will be able to perform a SLR without extensor lag for improved strength. [] Met [] Not met [] Partially met    Max L knee pain 5/10 for increase activity tolerance. [] Met [] Not met [] Partially met    Pt will be able to ambulate with correct heel to toe mechanics using RW for improvement in gait. [] Met [] Not met [] Partially met      Long Term Goals: To be accomplished in 16-24 treatments. 1) Pt will have improved AMPAC score by 5%. [] Met [] Not met [] Partially met   2) Pt will have ROM/MMT WFL to improve joint function and stability without pain. [] Met [] Not met [] Partially met   3) Patient can ambulate community distances with LRAD and without knee pain or instability. [] Met [] Not met [] Partially met   4) Patient can get up after sitting for >/= 30 minutes without difficulty or stumbling. [] Met [] Not met [] Partially met   5) Patient can negotiate uneven terrain with LRAD without knee pain or instability. [] Met [] Not met [] Partially met     6) Patient can go up and down steps reciprocally with railing without difficulty or fatigue. [] Met [] Not met [] Partially met   7) Pt will be able to squat to retrieve item from ground without increase of pain. [] Met [] Not met [] Partially met   8) Pt will have decreased swelling by 1/2 cm to improve flexibility and decrease stiffness. [] Met [] Not met [] Partially met    9) TUG score <20 seconds with LRAD to reduce risk for falls. [] Met [] Not met [] Partially met       PLAN:  Frequency / Duration: Patient to be seen 2-3 times per week for 16-24 treatments.   Certification Period: (Initial) 3/22/23 - 6/23/23  Treatment Plan may include any combination of the following: Therapeutic exercise, Neuromuscular reeducation, Manual therapy, Therapeutic activity, Self care/home management, Electric stim unattended , and Gait training     [x]  Continue plan of care  [x]  Upgrade activities as tolerated       [x]  Update interventions per flow sheet     [x]  Other:    get girth measurements at knee joint   []  Discharge due to:     Kaye Mitchell, PT, DPT 3/30/2023

## 2023-04-10 ENCOUNTER — HOSPITAL ENCOUNTER (OUTPATIENT)
Dept: PHYSICAL THERAPY | Age: 63
Discharge: HOME | End: 2023-04-10
Payer: COMMERCIAL

## 2023-04-10 PROCEDURE — 97110 THERAPEUTIC EXERCISES: CPT

## 2023-04-12 ENCOUNTER — APPOINTMENT (OUTPATIENT)
Dept: PHYSICAL THERAPY | Age: 63
End: 2023-04-12
Payer: COMMERCIAL

## 2023-04-14 ENCOUNTER — HOSPITAL ENCOUNTER (OUTPATIENT)
Dept: PHYSICAL THERAPY | Age: 63
Discharge: HOME | End: 2023-04-14
Payer: COMMERCIAL

## 2023-04-14 PROCEDURE — 97110 THERAPEUTIC EXERCISES: CPT

## 2023-04-17 ENCOUNTER — APPOINTMENT (OUTPATIENT)
Dept: PHYSICAL THERAPY | Age: 63
End: 2023-04-17
Payer: COMMERCIAL

## 2023-04-19 ENCOUNTER — APPOINTMENT (OUTPATIENT)
Dept: PHYSICAL THERAPY | Age: 63
End: 2023-04-19
Payer: COMMERCIAL

## 2023-04-21 ENCOUNTER — APPOINTMENT (OUTPATIENT)
Dept: PHYSICAL THERAPY | Age: 63
End: 2023-04-21
Payer: COMMERCIAL

## 2023-04-24 ENCOUNTER — HOSPITAL ENCOUNTER (OUTPATIENT)
Dept: PHYSICAL THERAPY | Age: 63
Discharge: HOME | End: 2023-04-24
Payer: COMMERCIAL

## 2023-04-24 PROCEDURE — 97110 THERAPEUTIC EXERCISES: CPT | Performed by: PHYSICAL THERAPIST

## 2023-04-24 PROCEDURE — 97140 MANUAL THERAPY 1/> REGIONS: CPT | Performed by: PHYSICAL THERAPIST

## 2023-04-26 ENCOUNTER — APPOINTMENT (OUTPATIENT)
Dept: PHYSICAL THERAPY | Age: 63
End: 2023-04-26
Payer: COMMERCIAL

## 2023-04-28 ENCOUNTER — APPOINTMENT (OUTPATIENT)
Dept: PHYSICAL THERAPY | Age: 63
End: 2023-04-28
Payer: COMMERCIAL

## 2023-05-09 ENCOUNTER — APPOINTMENT (OUTPATIENT)
Dept: PHYSICAL THERAPY | Age: 63
End: 2023-05-09

## 2023-08-28 ENCOUNTER — OFFICE VISIT (OUTPATIENT)
Age: 63
End: 2023-08-28
Payer: COMMERCIAL

## 2023-08-28 DIAGNOSIS — M25.562 LEFT KNEE PAIN, UNSPECIFIED CHRONICITY: Primary | ICD-10-CM

## 2023-08-28 PROCEDURE — 99213 OFFICE O/P EST LOW 20 MIN: CPT | Performed by: ORTHOPAEDIC SURGERY

## 2023-08-28 NOTE — PROGRESS NOTES
Name: Dorota West    : 1960     [facility] [address] [facility]  [address] [place] [address], [facility] [address] [facility] [address] [address]  Dept: [phone]  Dept Fax: [phone]     Chief Complaint   Patient presents with    Knee Pain        There were no vitals taken for this visit. Allergies   Allergen Reactions    Pregabalin Anaphylaxis    Metoclopramide Other (See Comments)     Facial Spasms  Other reaction(s): could not open eyes        Current Outpatient Medications   Medication Sig Dispense Refill    albuterol sulfate HFA (PROVENTIL;VENTOLIN;PROAIR) 108 (90 Base) MCG/ACT inhaler 4 times daily      ferrous sulfate (FE TABS 325) 325 (65 Fe) MG EC tablet Take by mouth      losartan-hydroCHLOROthiazide (HYZAAR) 50-12.5 MG per tablet       naproxen sodium (ANAPROX) 220 MG tablet 2 times daily      sodium bicarbonate 650 MG tablet       acetaminophen (TYLENOL) 650 MG extended release tablet Take 650 mg by mouth every 6 hours as needed      amLODIPine (NORVASC) 10 MG tablet Take by mouth daily      cyclobenzaprine (FLEXERIL) 10 MG tablet ceived the following from [facility]: Outside name: cyclobenzaprine (FLEXERIL) 10 mg tablet      HYDROcodone-acetaminophen (NORCO) 5-325 MG per tablet TAKE 1 TABLET BY MOUTH EVERY 12 HOURS AS NEEDED FOR 14 DAYS      metoprolol succinate (TOPROL XL) 50 MG extended release tablet Take 50 mg by mouth daily      ondansetron (ZOFRAN-ODT) 4 MG disintegrating tablet Take 4 mg by mouth every 8 hours as needed       No current facility-administered medications for this visit.       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

## (undated) DEVICE — SOL IRR NACL 0.9% 500ML POUR --

## (undated) DEVICE — PREP SKN CHLRAPRP APL 26ML STR --

## (undated) DEVICE — WRAP KNEE UNIV E STRP HK AND LOOP W/ GEL PK MEDCOOL

## (undated) DEVICE — SUTURE VCRL + SZ 1 L27IN ANTIBACTERIAL POLYGLACTIN 910 W VCP281H

## (undated) DEVICE — HYPODERMIC SAFETY NEEDLE: Brand: MAGELLAN

## (undated) DEVICE — BLADE SAW W12.5XL70MM THK1MM RECIP DBL SIDE OFFSET

## (undated) DEVICE — POWDER SURG CELLERATE RX 1 GM HYDROL COLLEGEN

## (undated) DEVICE — POWDER HEMOSTAT GEL 3.0GR -- SURGICEL

## (undated) DEVICE — GLOVE SURG 7 BIOGEL PI ULTRATOUCH G

## (undated) DEVICE — SUTURE VCRL SZ 3-0 L27IN ABSRB UD L24MM PS-1 3/8 CIR PRIM J936H

## (undated) DEVICE — SUTURE VCRL + SZ 0 L27IN ANTIBACTERIAL POLYGLACTIN 910 W VCP280H

## (undated) DEVICE — SHEET,DRAPE,70X100,STERILE: Brand: MEDLINE

## (undated) DEVICE — GLOVE SURG SZ 65 THK91MIL LTX FREE SYN POLYISOPRENE

## (undated) DEVICE — 3M™ IOBAN™ 2 ANTIMICROBIAL INCISE DRAPE 6650EZ: Brand: IOBAN™ 2

## (undated) DEVICE — HOOD WITH PEEL AWAY FACE SHIELD: Brand: T7PLUS

## (undated) DEVICE — BLADE ELECTRODE: Brand: VALLEYLAB

## (undated) DEVICE — 450 ML BOTTLE OF 0.05% CHLORHEXIDINE GLUCONATE IN 99.95% STERILE WATER FOR IRRIGATION, USP AND APPLICATOR.: Brand: IRRISEPT ANTIMICROBIAL WOUND LAVAGE

## (undated) DEVICE — GLOVE SURG UNDERGLOVE 7.5 PF BLU BIOGEL PI MIC LF

## (undated) DEVICE — STRYKER PERFORMANCE SERIES SAGITTAL BLADE: Brand: STRYKER PERFORMANCE SERIES

## (undated) DEVICE — TOTAL KNEE PACK: Brand: MEDLINE INDUSTRIES, INC.

## (undated) DEVICE — SPONGE LAP W18XL18IN WHT COT 4 PLY FLD STRUNG RADPQ DISP ST

## (undated) DEVICE — (D)HANDPIECE IRR W/HI FLO TIP -- DUPLICATE USE ITEM 121586

## (undated) DEVICE — 3M™ STERI-DRAPE™ INSTRUMENT POUCH 1018: Brand: STERI-DRAPE™

## (undated) DEVICE — BANDAGE COBAN 4 IN COMPR W4INXL5YD FOAM COHESIVE QUIK STK SELF ADH SFT

## (undated) DEVICE — GARMENT COMPR M FOR 13IN FT INTMIT SGL BLDR HEM FORC II

## (undated) DEVICE — BNDG,ELSTC,MATRIX,STRL,6"X5YD,LF,HOOK&LP: Brand: MEDLINE

## (undated) DEVICE — STERILE POLYISOPRENE POWDER-FREE SURGICAL GLOVES WITH EMOLLIENT COATING: Brand: PROTEXIS

## (undated) DEVICE — SURGIFOAM SPNG SZ 100

## (undated) DEVICE — DRAPE,TOP,102X53,STERILE: Brand: MEDLINE

## (undated) DEVICE — 3M™ TEGADERM™ HP TRANSPARENT FILM DRESSING FRAME STYLE, 9546HP, 4 IN X 4-1/2 IN (10 CM X 11.5 CM), 50/CT 4CT/CASE: Brand: 3M™ TEGADERM™

## (undated) DEVICE — GUIDEPIN ORTH THRD HI PERF HD SIG

## (undated) DEVICE — 4-PORT MANIFOLD: Brand: NEPTUNE 2

## (undated) DEVICE — INTENDED FOR TISSUE SEPARATION, AND OTHER PROCEDURES THAT REQUIRE A SHARP SURGICAL BLADE TO PUNCTURE OR CUT.: Brand: BARD-PARKER SAFETY BLADES SIZE 10, STERILE

## (undated) DEVICE — SKIN CLOS DERMABND PRINEO 60CM -- DERMABOUND PRINEO

## (undated) DEVICE — TOWEL,OR,DSP,ST,BLUE,STD,4/PK,20PK/CS: Brand: MEDLINE

## (undated) DEVICE — DRESSING ANTIMIC FOAM OPTIFOAM POSTOP ADH 4X14 IN

## (undated) DEVICE — GLOVE SURG SZ 6 THK91MIL LTX FREE SYN POLYISOPRENE ANTI

## (undated) DEVICE — BOWL MIXING VAC PALABOWL PALACOS

## (undated) DEVICE — ZIMMER® STERILE DISPOSABLE TOURNIQUET CUFF WITH PLC, DUAL PORT, SINGLE BLADDER, 34 IN. (86 CM)

## (undated) DEVICE — GLOVE ORANGE PI 8 1/2   MSG9085

## (undated) DEVICE — GOWN,PRECEPT,XLNG/XXLARGE,STRL: Brand: MEDLINE

## (undated) DEVICE — PIN DRL QUIK HI PERF FOR SIG SYS

## (undated) DEVICE — GOWN,AURORA,FABRIC-REINFORCED,X-LARGE: Brand: MEDLINE

## (undated) DEVICE — COVER,TABLE,HEAVY DUTY,77"X90",STRL: Brand: MEDLINE

## (undated) DEVICE — SUT VCRL + 2-0 27IN CT2 UD -- 36/BX